# Patient Record
Sex: FEMALE | Race: WHITE | Employment: UNEMPLOYED | URBAN - METROPOLITAN AREA
[De-identification: names, ages, dates, MRNs, and addresses within clinical notes are randomized per-mention and may not be internally consistent; named-entity substitution may affect disease eponyms.]

---

## 2019-01-30 ENCOUNTER — HOSPITAL ENCOUNTER (EMERGENCY)
Age: 34
Discharge: HOME | End: 2019-01-30
Payer: COMMERCIAL

## 2019-01-30 VITALS
WEIGHT: 168.65 LBS | OXYGEN SATURATION: 94 % | RESPIRATION RATE: 16 BRPM | TEMPERATURE: 97.5 F | SYSTOLIC BLOOD PRESSURE: 150 MMHG | HEART RATE: 105 BPM | DIASTOLIC BLOOD PRESSURE: 105 MMHG | HEIGHT: 66 IN | BODY MASS INDEX: 27.1 KG/M2

## 2019-01-30 DIAGNOSIS — J02.9 ACUTE VIRAL PHARYNGITIS: Primary | ICD-10-CM

## 2019-01-30 LAB — S PYO AG THROAT QL: NEGATIVE

## 2019-01-30 PROCEDURE — 87081 CULTURE SCREEN ONLY: CPT

## 2019-01-30 PROCEDURE — 87880 STREP A ASSAY W/OPTIC: CPT

## 2019-01-30 PROCEDURE — 99283 EMERGENCY DEPT VISIT LOW MDM: CPT

## 2019-01-30 RX ORDER — IBUPROFEN 600 MG/1
600 TABLET ORAL EVERY 6 HOURS PRN
Qty: 20 TABLET | Refills: 0 | Status: SHIPPED | OUTPATIENT
Start: 2019-01-30 | End: 2019-07-11

## 2019-01-30 RX ORDER — ACETAMINOPHEN 500 MG
1000 TABLET ORAL EVERY 6 HOURS PRN
Qty: 30 TABLET | Refills: 0 | Status: SHIPPED | OUTPATIENT
Start: 2019-01-30 | End: 2022-01-18 | Stop reason: ALTCHOICE

## 2019-01-30 ASSESSMENT — PAIN SCALES - GENERAL: PAINLEVEL_OUTOF10: 3

## 2019-01-30 ASSESSMENT — PAIN DESCRIPTION - LOCATION: LOCATION: THROAT

## 2019-01-31 LAB
ORGANISM: ABNORMAL
S PYO THROAT QL CULT: ABNORMAL
S PYO THROAT QL CULT: ABNORMAL

## 2019-06-15 ENCOUNTER — HOSPITAL ENCOUNTER (EMERGENCY)
Age: 34
Discharge: HOME | End: 2019-06-16
Payer: COMMERCIAL

## 2019-06-15 VITALS
DIASTOLIC BLOOD PRESSURE: 73 MMHG | OXYGEN SATURATION: 100 % | HEART RATE: 105 BPM | BODY MASS INDEX: 26.36 KG/M2 | RESPIRATION RATE: 16 BRPM | WEIGHT: 164.02 LBS | HEIGHT: 66 IN | SYSTOLIC BLOOD PRESSURE: 114 MMHG | TEMPERATURE: 98 F

## 2019-06-15 DIAGNOSIS — N30.01 ACUTE CYSTITIS WITH HEMATURIA: Primary | ICD-10-CM

## 2019-06-15 LAB
BACTERIA: ABNORMAL /HPF
BILIRUBIN URINE: NEGATIVE
BLOOD, URINE: ABNORMAL
CLARITY: ABNORMAL
COLOR: ABNORMAL
COMMENT UA: ABNORMAL
EPITHELIAL CELLS, UA: ABNORMAL /HPF
GLUCOSE URINE: NEGATIVE MG/DL
HCG(URINE) PREGNANCY TEST: NEGATIVE
KETONES, URINE: ABNORMAL MG/DL
LEUKOCYTE ESTERASE, URINE: ABNORMAL
MICROSCOPIC EXAMINATION: YES
NITRITE, URINE: POSITIVE
PH UA: 6 (ref 5–8)
PROTEIN UA: 100 MG/DL
RBC UA: ABNORMAL /HPF (ref 0–2)
SPECIFIC GRAVITY UA: >=1.03 (ref 1–1.03)
URINE REFLEX TO CULTURE: YES
URINE TYPE: ABNORMAL
UROBILINOGEN, URINE: 0.2 E.U./DL
WBC UA: ABNORMAL /HPF (ref 0–5)
YEAST: PRESENT /HPF

## 2019-06-15 PROCEDURE — 99283 EMERGENCY DEPT VISIT LOW MDM: CPT

## 2019-06-15 PROCEDURE — 87077 CULTURE AEROBIC IDENTIFY: CPT

## 2019-06-15 PROCEDURE — 81001 URINALYSIS AUTO W/SCOPE: CPT

## 2019-06-15 PROCEDURE — 87086 URINE CULTURE/COLONY COUNT: CPT

## 2019-06-15 PROCEDURE — 6370000000 HC RX 637 (ALT 250 FOR IP): Performed by: PHYSICIAN ASSISTANT

## 2019-06-15 PROCEDURE — 87186 SC STD MICRODIL/AGAR DIL: CPT

## 2019-06-15 PROCEDURE — 84703 CHORIONIC GONADOTROPIN ASSAY: CPT

## 2019-06-15 RX ORDER — PHENAZOPYRIDINE HYDROCHLORIDE 100 MG/1
200 TABLET, FILM COATED ORAL ONCE
Status: COMPLETED | OUTPATIENT
Start: 2019-06-15 | End: 2019-06-15

## 2019-06-15 RX ORDER — PHENAZOPYRIDINE HYDROCHLORIDE 200 MG/1
200 TABLET, FILM COATED ORAL 3 TIMES DAILY PRN
Qty: 6 TABLET | Refills: 0 | Status: SHIPPED | OUTPATIENT
Start: 2019-06-15 | End: 2019-06-18

## 2019-06-15 RX ORDER — NITROFURANTOIN 25; 75 MG/1; MG/1
100 CAPSULE ORAL 2 TIMES DAILY
Qty: 9 CAPSULE | Refills: 0 | Status: SHIPPED | OUTPATIENT
Start: 2019-06-15 | End: 2019-06-20

## 2019-06-15 RX ORDER — NITROFURANTOIN 25; 75 MG/1; MG/1
100 CAPSULE ORAL ONCE
Status: COMPLETED | OUTPATIENT
Start: 2019-06-15 | End: 2019-06-15

## 2019-06-15 RX ADMIN — PHENAZOPYRIDINE HYDROCHLORIDE 200 MG: 100 TABLET ORAL at 23:52

## 2019-06-15 RX ADMIN — NITROFURANTOIN MONOHYDRATE/MACROCRYSTALLINE 100 MG: 25; 75 CAPSULE ORAL at 23:52

## 2019-06-15 ASSESSMENT — PAIN DESCRIPTION - DESCRIPTORS: DESCRIPTORS: BURNING

## 2019-06-15 ASSESSMENT — ENCOUNTER SYMPTOMS
EYE PAIN: 0
SHORTNESS OF BREATH: 0
DIARRHEA: 0
BACK PAIN: 0
NAUSEA: 0
ABDOMINAL PAIN: 0
VOMITING: 0
COUGH: 0

## 2019-06-15 ASSESSMENT — PAIN DESCRIPTION - PAIN TYPE: TYPE: ACUTE PAIN

## 2019-06-15 ASSESSMENT — PAIN DESCRIPTION - FREQUENCY: FREQUENCY: CONTINUOUS

## 2019-06-15 ASSESSMENT — PAIN SCALES - GENERAL: PAINLEVEL_OUTOF10: 5

## 2019-06-15 ASSESSMENT — PAIN DESCRIPTION - LOCATION: LOCATION: VAGINA

## 2019-06-16 PROCEDURE — 87491 CHLMYD TRACH DNA AMP PROBE: CPT

## 2019-06-16 PROCEDURE — 87591 N.GONORRHOEAE DNA AMP PROB: CPT

## 2019-06-16 ASSESSMENT — PAIN SCALES - GENERAL: PAINLEVEL_OUTOF10: 5

## 2019-06-16 ASSESSMENT — PAIN - FUNCTIONAL ASSESSMENT: PAIN_FUNCTIONAL_ASSESSMENT: 0-10

## 2019-06-16 NOTE — ED NOTES
**EVALUATED BY ADVANCED PRACTICE PROVIDER**        [address] [facility] ENCOUNTER      Pt Name: Arsen Velazquez  EUY:[id number]  [place] 1985  Date of evaluation: 6/15/2019  Provider: JOAO Goyal      Chief Complaint:    Chief Complaint   Patient presents with    Dysuria    Hematuria       Nursing Notes, Past Medical Hx, Past Surgical Hx, Social Hx, Allergies, and Family Hx were all reviewed and agreed with or any disagreements were addressed in the HPI.    HPI:  (Location, Duration, Timing, Severity,Quality, Assoc Sx, Context, Modifying factors)  This is a  29 y.o. female who presents to the ED for evaluation of dysuria. Onset: 2 days. Reports hematuria and spasming with urinating. She rates her pain is a 5 out of 10. Described as burning in character. It is constant. Worse with urinating. Denies vaginal discharge. No other acute concerns, associated symptoms or modifying factors. PastMedical/Surgical History:      Diagnosis Date    Endocarditis     Hepatitis C     Lyme disease     MRSA (methicillin resistant staph aureus) culture positive 14         Procedure Laterality Date     SECTION         Medications:  Previous Medications    ACETAMINOPHEN (APAP EXTRA STRENGTH) 500 MG TABLET    Take 2 tablets by mouth every 6 hours as needed for Pain DO NOT TAKE WITH OTHER MEDICATIONS CONTAINING ACETAMINOPHEN. IBUPROFEN (ADVIL;MOTRIN) 600 MG TABLET    Take 1 tablet by mouth every 6 hours as needed for Pain    MAGIC MOUTHWASH (MIRACLE MOUTHWASH)    Swish and spit 5 mLs 4 times daily as needed for Pain Equal parts 2% lidocaine, dIphenhydramine, antacid. Review of Systems:  Review of Systems   Constitutional: Negative for chills, fatigue and fever. Eyes: Negative for pain. Respiratory: Negative for cough and shortness of breath. Cardiovascular: Negative for chest pain.    Gastrointestinal: Negative for abdominal pain, diarrhea, nausea and vomiting. Genitourinary: Positive for dysuria and hematuria. Musculoskeletal: Negative for back pain, neck pain and neck stiffness. Skin: Negative for rash. Neurological: Negative for dizziness and headaches. Psychiatric/Behavioral: Negative for confusion. Positives and Pertinent negatives as per HPI. Except as noted above in the ROS, problem specific ROS was completed and is negative. Physical Exam:  Physical Exam   Constitutional: She is oriented to person, place, and time. She appears well-developed. No distress. HENT:   Head: Normocephalic and atraumatic. Eyes: Right eye exhibits no discharge. Left eye exhibits no discharge. Neck: Normal range of motion. Neck supple. Cardiovascular: Normal rate. Pulmonary/Chest: Effort normal. No stridor. No respiratory distress. Abdominal: Soft. There is tenderness (very mild suprapubic/bladder). Musculoskeletal: Normal range of motion. Neurological: She is alert and oriented to person, place, and time. No gross facial drooping. Moves all 4 extremities spontaneously. Skin: Skin is warm and dry. She is not diaphoretic. No pallor. Psychiatric: She has a normal mood and affect. Her behavior is normal.   Nursing note and vitals reviewed.       MEDICAL DECISION MAKING    Vitals:    Vitals:    06/15/19 2227   BP: 114/73   Pulse: 105   Resp: 16   Temp: 98 °F (36.7 °C)   SpO2: 100%   Weight: 164 lb 0.4 oz (74.4 kg)   Height: 5' 6\" (1.676 m)       LABS:  Labs Reviewed   URINE RT REFLEX TO CULTURE - Abnormal; Notable for the following components:       Result Value    Clarity, UA TURBID (*)     Ketones, Urine TRACE (*)     Blood, Urine LARGE (*)     Protein,  (*)     Nitrite, Urine POSITIVE (*)     Leukocyte Esterase, Urine MODERATE (*)     All other components within normal limits    Narrative:     Performed at:  [place] [address] Phone [phone]   MICROSCOPIC URINALYSIS - Abnormal; Notable for the following components:    WBC, UA 20-50 (*)     RBC, UA  (*)     Bacteria, UA 1+ (*)     Yeast, UA Present (*)     All other components within normal limits    Narrative:     Performed at:  [address] [address]   Phone [phone]   URINE CULTURE   C.TRACHOMATIS N.GONORRHOEAE DNA   PREGNANCY, URINE    Narrative:     Performed at:  [address] [address]   Phone ([phone] of labs reviewed and werenegative at this time or not returned at the time of this note. RADIOLOGY:   Non-plain film images such as CT, Ultrasound and MRI are read by the radiologist. JOAO Kwong have directly visualized the radiologic plain film image(s) with the below findings:        Interpretation per the Radiologist below, if available at the time of thisnote:    No orders to display        No results found. MEDICAL DECISION MAKING / ED COURSE:      PROCEDURES:   Procedures    None    Patient was given:  Medications   phenazopyridine (PYRIDIUM) tablet 200 mg (has no administration in time range)   nitrofurantoin (macrocrystal-monohydrate) (MACROBID) capsule 100 mg (has no administration in time range)         Differential Diagnosis: Urinary retention, pyelonephritis, bladder infection, urosepsis, GI emergency, surgical emergency, dehydration, other    Patient is afebrile and nontoxic with unremarkable vital signs. Urinalysis positive for infection. No trich. The patient does report a new sexual partner last week and wanted to be screened gonorrhea and chlamydia. She denies any vaginal discharge or vaginal pain. She was not treated for G/C. Given first dose of Macrobid and Pyridium here in the emergency department.  At this time I believe patient's presentation does not warrant further workup with labs or imaging in the emergency department and is stable for discharge home. Patient will be discharged with medications as listed below with instructions to follow up with the primary care physician for reevaluation in the next few days, and to return to the emergency department for any worsening symptoms or further concerns. They verbalized understanding and were discharged in stable condition. The patient tolerated their visit well. I evaluated the patient. The physician was available for consultation as needed. The patient and / or the family were informed of the results of anytests, a time was given to answer questions, a plan was proposed and they agreed with plan. CLINICAL IMPRESSION:  1.  Acute cystitis with hematuria        DISPOSITION Discharge - Pending Orders Complete 06/15/2019 11:40:01 PM      PATIENT REFERRED TO:  [facility]  [address] [place] [address]  [phone]    If symptoms worsen      DISCHARGE MEDICATIONS:  New Prescriptions    NITROFURANTOIN, MACROCRYSTAL-MONOHYDRATE, (MACROBID) 100 MG CAPSULE    Take 1 capsule by mouth 2 times daily for 5 days (1st dose give in ED)    PHENAZOPYRIDINE (PYRIDIUM) 200 MG TABLET    Take 1 tablet by mouth 3 times daily as needed for Pain (bladder spasm/pain)       DISCONTINUED MEDICATIONS:  Discontinued Medications    No medications on file              (Please note the MDM and HPI sections of this note were completed with a voice recognition program.  Efforts weremade to edit the dictations but occasionally words are mis-transcribed.)    Electronically signed, Renetta Gomez,          Renetta Gomez  06/15/19 8739

## 2019-06-17 LAB
C TRACH DNA GENITAL QL NAA+PROBE: NEGATIVE
N. GONORRHOEAE DNA: NEGATIVE

## 2019-06-18 LAB
ORGANISM: ABNORMAL
URINE CULTURE, ROUTINE: ABNORMAL
URINE CULTURE, ROUTINE: ABNORMAL

## 2019-06-18 NOTE — ED PROVIDER NOTES
abdominal pain, diarrhea, nausea and vomiting. Genitourinary: Positive for dysuria and hematuria. Musculoskeletal: Negative for back pain, neck pain and neck stiffness. Skin: Negative for rash. Neurological: Negative for dizziness and headaches. Psychiatric/Behavioral: Negative for confusion. Positives and Pertinent negatives as per HPI. Except as noted above in the ROS, problem specific ROS was completed and is negative. Physical Exam:  Physical Exam   Constitutional: She is oriented to person, place, and time. She appears well-developed. No distress. HENT:   Head: Normocephalic and atraumatic. Eyes: Right eye exhibits no discharge. Left eye exhibits no discharge. Neck: Normal range of motion. Neck supple. Cardiovascular: Normal rate. Pulmonary/Chest: Effort normal. No stridor. No respiratory distress. Abdominal: Soft. There is tenderness (very mild suprapubic/bladder). Musculoskeletal: Normal range of motion. Neurological: She is alert and oriented to person, place, and time. No gross facial drooping. Moves all 4 extremities spontaneously. Skin: Skin is warm and dry. She is not diaphoretic. No pallor. Psychiatric: She has a normal mood and affect. Her behavior is normal.   Nursing note and vitals reviewed.       MEDICAL DECISION MAKING    Vitals:    Vitals:    06/15/19 2227   BP: 114/73   Pulse: 105   Resp: 16   Temp: 98 °F (36.7 °C)   SpO2: 100%   Weight: 164 lb 0.4 oz (74.4 kg)   Height: 5' 6\" (1.676 m)       LABS:  Labs Reviewed   URINE RT REFLEX TO CULTURE - Abnormal; Notable for the following components:       Result Value    Clarity, UA TURBID (*)     Ketones, Urine TRACE (*)     Blood, Urine LARGE (*)     Protein,  (*)     Nitrite, Urine POSITIVE (*)     Leukocyte Esterase, Urine MODERATE (*)     All other components within normal limits    Narrative:     Performed at:  [facility]  [address] [place] [address] Phone [phone]   MICROSCOPIC URINALYSIS - Abnormal; Notable for the following components:    WBC, UA 20-50 (*)     RBC, UA  (*)     Bacteria, UA 1+ (*)     Yeast, UA Present (*)     All other components within normal limits    Narrative:     Performed at:  [facility]  [address] [facility] [address]   Phone [phone]   URINE CULTURE   C.TRACHOMATIS N.GONORRHOEAE DNA   PREGNANCY, URINE    Narrative:     Performed at:  [facility]  [address] [facility] [address]   Phone ([phone] of labs reviewed and werenegative at this time or not returned at the time of this note. RADIOLOGY:   Non-plain film images such as CT, Ultrasound and MRI are read by the radiologist. JOAO Sequeira have directly visualized the radiologic plain film image(s) with the below findings:        Interpretation per the Radiologist below, if available at the time of thisnote:    No orders to display        No results found. MEDICAL DECISION MAKING / ED COURSE:      PROCEDURES:   Procedures    None    Patient was given:  Medications   phenazopyridine (PYRIDIUM) tablet 200 mg (has no administration in time range)   nitrofurantoin (macrocrystal-monohydrate) (MACROBID) capsule 100 mg (has no administration in time range)         Differential Diagnosis: Urinary retention, pyelonephritis, bladder infection, urosepsis, GI emergency, surgical emergency, dehydration, other    Patient is afebrile and nontoxic with unremarkable vital signs. Urinalysis positive for infection. No trich. The patient does report a new sexual partner last week and wanted to be screened gonorrhea and chlamydia. She denies any vaginal discharge or vaginal pain. She was not treated for G/C. Given first dose of Macrobid and Pyridium here in the emergency department.  At this time I believe patient's presentation does not warrant further workup with labs or imaging in the emergency department and is stable for discharge home. Patient will be discharged with medications as listed below with instructions to follow up with the primary care physician for reevaluation in the next few days, and to return to the emergency department for any worsening symptoms or further concerns. They verbalized understanding and were discharged in stable condition. The patient tolerated their visit well. I evaluated the patient. The physician was available for consultation as needed. The patient and / or the family were informed of the results of anytests, a time was given to answer questions, a plan was proposed and they agreed with plan. CLINICAL IMPRESSION:  1.  Acute cystitis with hematuria        DISPOSITION Discharge - Pending Orders Complete 06/15/2019 11:40:01 PM      PATIENT REFERRED TO:  [address] [facility] Emergency Department  [address] [place] [address]  [phone]    If symptoms worsen      DISCHARGE MEDICATIONS:  New Prescriptions    NITROFURANTOIN, MACROCRYSTAL-MONOHYDRATE, (MACROBID) 100 MG CAPSULE    Take 1 capsule by mouth 2 times daily for 5 days (1st dose give in ED)    PHENAZOPYRIDINE (PYRIDIUM) 200 MG TABLET    Take 1 tablet by mouth 3 times daily as needed for Pain (bladder spasm/pain)       DISCONTINUED MEDICATIONS:  Discontinued Medications    No medications on file              (Please note the MDM and HPI sections of this note were completed with a voice recognition program.  Efforts weremade to edit the dictations but occasionally words are mis-transcribed.)    Electronically signed, Renetta Ford,          Renetta Ford  06/15/19 [address] [place], [place]  06/18/19 4317

## 2019-06-28 NOTE — ED PROVIDER NOTES
**EVALUATED BY ADVANCED PRACTICE PROVIDER**          [address]       Pt Name: Roberta Bright  HCA:[id number]  [place] 1985  Date of evaluation: 6/15/2019  Provider: JOAO Pinto        Chief Complaint:        Chief Complaint   Patient presents with    Dysuria    Hematuria         Nursing Notes, Past Medical Hx, Past Surgical Hx, Social Hx, Allergies, and Family Hx were all reviewed and agreed with or any disagreements were addressed in the HPI.     HPI:  (Location, Duration, Timing, Severity,Quality, Assoc Sx, Context, Modifying factors)  This is a  29 y.o. female who presents to the ED for evaluation of dysuria. Onset: 2 days. Reports hematuria and spasming with urinating. She rates her pain is a 5 out of 10. Described as burning in character. It is constant. Worse with urinating. Denies vaginal discharge. No other acute concerns, associated symptoms or modifying factors.              PastMedical/Surgical History:  Past Medical History             Diagnosis Date    Endocarditis      Hepatitis C      Lyme disease      MRSA (methicillin resistant staph aureus) culture positive 14         Past Surgical History             Procedure Laterality Date     SECTION                Medications:       Previous Medications     ACETAMINOPHEN (APAP EXTRA STRENGTH) 500 MG TABLET    Take 2 tablets by mouth every 6 hours as needed for Pain DO NOT TAKE WITH OTHER MEDICATIONS CONTAINING ACETAMINOPHEN.     IBUPROFEN (ADVIL;MOTRIN) 600 MG TABLET    Take 1 tablet by mouth every 6 hours as needed for Pain     MAGIC MOUTHWASH (MIRACLE MOUTHWASH)    Swish and spit 5 mLs 4 times daily as needed for Pain Equal parts 2% lidocaine, dIphenhydramine, antacid.            Review of Systems:  Review of Systems   Constitutional: Negative for chills, fatigue and fever. Eyes: Negative for pain.    Respiratory: Negative for cough and shortness of breath. Cardiovascular: Negative for chest pain. Gastrointestinal: Negative for abdominal pain, diarrhea, nausea and vomiting. Genitourinary: Positive for dysuria and hematuria. Musculoskeletal: Negative for back pain, neck pain and neck stiffness. Skin: Negative for rash. Neurological: Negative for dizziness and headaches. Psychiatric/Behavioral: Negative for confusion.      Positives and Pertinent negatives as per HPI. Except as noted above in the ROS, problem specific ROS was completed and is negative.     Physical Exam:  Physical Exam   Constitutional: She is oriented to person, place, and time. She appears well-developed. No distress. HENT:   Head: Normocephalic and atraumatic. Eyes: Right eye exhibits no discharge. Left eye exhibits no discharge. Neck: Normal range of motion. Neck supple. Cardiovascular: Normal rate. Pulmonary/Chest: Effort normal. No stridor. No respiratory distress. Abdominal: Soft. There is tenderness (very mild suprapubic/bladder). Musculoskeletal: Normal range of motion. Neurological: She is alert and oriented to person, place, and time. No gross facial drooping. Moves all 4 extremities spontaneously. Skin: Skin is warm and dry. She is not diaphoretic. No pallor. Psychiatric: She has a normal mood and affect.  Her behavior is normal.   Nursing note and vitals reviewed.        MEDICAL DECISION MAKING     Vitals:    Vitals       Vitals:     06/15/19 2227   BP: 114/73   Pulse: 105   Resp: 16   Temp: 98 °F (36.7 °C)   SpO2: 100%   Weight: 164 lb 0.4 oz (74.4 kg)   Height: 5' 6\" (1.676 m)            LABS:        Labs Reviewed   URINE RT REFLEX TO CULTURE - Abnormal; Notable for the following components:       Result Value      Clarity, UA TURBID (*)       Ketones, Urine TRACE (*)       Blood, Urine LARGE (*)       Protein,  (*)       Nitrite, Urine POSITIVE (*)       Leukocyte Esterase, Urine MODERATE (*)       All other components within normal limits     Narrative:      Performed at:  [facility]  [address] [facility] [address]   Phone [phone]   MICROSCOPIC URINALYSIS - Abnormal; Notable for the following components:     WBC, UA 20-50 (*)       RBC, UA  (*)       Bacteria, UA 1+ (*)       Yeast, UA Present (*)       All other components within normal limits     Narrative:      Performed at:  [facility]  [address] [facility] [address]   Phone [phone]   URINE CULTURE   C.TRACHOMATIS N.GONORRHOEAE DNA   PREGNANCY, URINE     Narrative:      [facility]  [facility] [address]   Phone [phone]         Remainder of labs reviewed and werenegative at this time or not returned at the time of this note.     RADIOLOGY:   Non-plain film images such as CT, Ultrasound and MRI are read by the radiologist. JOAO Anderson have directly visualized the radiologic plain film image(s) with the below findings:           Interpretation per the Radiologist below, if available at the time of thisnote:     No orders to display         No results found.       MEDICAL DECISION MAKING / ED COURSE:       PROCEDURES:   Procedures     None     Patient was given:  Medications   phenazopyridine (PYRIDIUM) tablet 200 mg (has no administration in time range)   nitrofurantoin (macrocrystal-monohydrate) (MACROBID) capsule 100 mg (has no administration in time range)            Differential Diagnosis: Urinary retention, pyelonephritis, bladder infection, urosepsis, GI emergency, surgical emergency, dehydration, other     Patient is afebrile and nontoxic with unremarkable vital signs. Urinalysis positive for infection. No trich. The patient does report a new sexual partner last week and wanted to be screened gonorrhea and chlamydia. She denies any vaginal discharge or vaginal pain.  She was not treated for G/C. Given first dose of Macrobid and Pyridium here in the emergency department. At this time I believe patient's presentation does not warrant further workup with labs or imaging in the emergency department and is stable for discharge home. Patient will be discharged with medications as listed below with instructions to follow up with the primary care physician for reevaluation in the next few days, and to return to the emergency department for any worsening symptoms or further concerns. They verbalized understanding and were discharged in stable condition.     The patient tolerated their visit well. I evaluated the patient. The physician was available for consultation as needed. The patient and / or the family were informed of the results of anytests, a time was given to answer questions, a plan was proposed and they agreed with plan.       CLINICAL IMPRESSION:  1.  Acute cystitis with hematuria          DISPOSITION Discharge - Pending Orders Complete 06/15/2019 11:40:01 PM        PATIENT REFERRED TO:  [facility]  [address] [place] [phone]     If symptoms worsen        DISCHARGE MEDICATIONS:  New Prescriptions     NITROFURANTOIN, MACROCRYSTAL-MONOHYDRATE, (MACROBID) 100 MG CAPSULE    Take 1 capsule by mouth 2 times daily for 5 days (1st dose give in ED)     PHENAZOPYRIDINE (PYRIDIUM) 200 MG TABLET    Take 1 tablet by mouth 3 times daily as needed for Pain (bladder spasm/pain)         DISCONTINUED MEDICATIONS:      Discontinued Medications     No medications on file               (Please note the MDM and HPI sections of this note were completed with a voice recognition program.  Efforts weremade to edit the dictations but occasionally words are mis-transcribed.)     Electronically signed, JOAO Jordan,           Wilian Jordanma  06/15/19 [place]  06/28/19 [id number]

## 2019-07-11 ENCOUNTER — APPOINTMENT (OUTPATIENT)
Dept: GENERAL RADIOLOGY | Age: 34
End: 2019-07-11
Payer: COMMERCIAL

## 2019-07-11 ENCOUNTER — APPOINTMENT (OUTPATIENT)
Dept: ULTRASOUND IMAGING | Age: 34
End: 2019-07-11
Payer: COMMERCIAL

## 2019-07-11 ENCOUNTER — HOSPITAL ENCOUNTER (EMERGENCY)
Age: 34
Discharge: HOME | End: 2019-07-11
Payer: COMMERCIAL

## 2019-07-11 ENCOUNTER — APPOINTMENT (OUTPATIENT)
Dept: CT IMAGING | Age: 34
End: 2019-07-11
Payer: COMMERCIAL

## 2019-07-11 VITALS
OXYGEN SATURATION: 100 % | BODY MASS INDEX: 25.3 KG/M2 | WEIGHT: 156.75 LBS | SYSTOLIC BLOOD PRESSURE: 125 MMHG | TEMPERATURE: 97.1 F | DIASTOLIC BLOOD PRESSURE: 76 MMHG | RESPIRATION RATE: 16 BRPM | HEART RATE: 100 BPM

## 2019-07-11 DIAGNOSIS — F19.90 IVDU (INTRAVENOUS DRUG USER): ICD-10-CM

## 2019-07-11 DIAGNOSIS — R10.9 ABDOMINAL CRAMPING: ICD-10-CM

## 2019-07-11 DIAGNOSIS — N83.201 CYST OF RIGHT OVARY: ICD-10-CM

## 2019-07-11 DIAGNOSIS — N39.0 URINARY TRACT INFECTION WITHOUT HEMATURIA, SITE UNSPECIFIED: ICD-10-CM

## 2019-07-11 DIAGNOSIS — L98.9 SKIN LESION: ICD-10-CM

## 2019-07-11 DIAGNOSIS — K76.9 HEPATIC LESION: ICD-10-CM

## 2019-07-11 DIAGNOSIS — R19.7 DIARRHEA, UNSPECIFIED TYPE: Primary | ICD-10-CM

## 2019-07-11 LAB
A/G RATIO: 1.3 (ref 1.1–2.2)
ALBUMIN SERPL-MCNC: 4 G/DL (ref 3.4–5)
ALP BLD-CCNC: 66 U/L (ref 40–129)
ALT SERPL-CCNC: 12 U/L (ref 10–40)
ANION GAP SERPL CALCULATED.3IONS-SCNC: 12 MMOL/L (ref 3–16)
AST SERPL-CCNC: 13 U/L (ref 15–37)
BACTERIA: ABNORMAL /HPF
BASOPHILS ABSOLUTE: 0 K/UL (ref 0–0.2)
BASOPHILS RELATIVE PERCENT: 0.5 %
BILIRUB SERPL-MCNC: <0.2 MG/DL (ref 0–1)
BILIRUBIN URINE: ABNORMAL
BLOOD, URINE: ABNORMAL
BUN BLDV-MCNC: 8 MG/DL (ref 7–20)
CALCIUM SERPL-MCNC: 9.8 MG/DL (ref 8.3–10.6)
CHLORIDE BLD-SCNC: 100 MMOL/L (ref 99–110)
CLARITY: ABNORMAL
CO2: 25 MMOL/L (ref 21–32)
COLOR: ABNORMAL
CREAT SERPL-MCNC: 0.6 MG/DL (ref 0.6–1.1)
CRYSTALS, UA: ABNORMAL /HPF
EOSINOPHILS ABSOLUTE: 0.1 K/UL (ref 0–0.6)
EOSINOPHILS RELATIVE PERCENT: 1.6 %
EPITHELIAL CELLS, UA: >36 /HPF (ref 0–5)
GFR AFRICAN AMERICAN: >60
GFR NON-AFRICAN AMERICAN: >60
GLOBULIN: 3.2 G/DL
GLUCOSE BLD-MCNC: 103 MG/DL (ref 70–99)
GLUCOSE URINE: NEGATIVE MG/DL
HCG(URINE) PREGNANCY TEST: NEGATIVE
HCT VFR BLD CALC: 35.2 % (ref 36–48)
HEMOGLOBIN: 11.6 G/DL (ref 12–16)
HYALINE CASTS: 5 /LPF (ref 0–8)
KETONES, URINE: NEGATIVE MG/DL
LACTIC ACID: 0.6 MMOL/L (ref 0.4–2)
LEUKOCYTE ESTERASE, URINE: ABNORMAL
LIPASE: 16 U/L (ref 13–60)
LYMPHOCYTES ABSOLUTE: 2.4 K/UL (ref 1–5.1)
LYMPHOCYTES RELATIVE PERCENT: 32.2 %
MCH RBC QN AUTO: 28.7 PG (ref 26–34)
MCHC RBC AUTO-ENTMCNC: 33 G/DL (ref 31–36)
MCV RBC AUTO: 87 FL (ref 80–100)
MICROSCOPIC EXAMINATION: YES
MONOCYTES ABSOLUTE: 0.5 K/UL (ref 0–1.3)
MONOCYTES RELATIVE PERCENT: 6.7 %
NEUTROPHILS ABSOLUTE: 4.5 K/UL (ref 1.7–7.7)
NEUTROPHILS RELATIVE PERCENT: 59 %
NITRITE, URINE: NEGATIVE
PDW BLD-RTO: 13.5 % (ref 12.4–15.4)
PH UA: 6 (ref 5–8)
PLATELET # BLD: 292 K/UL (ref 135–450)
PMV BLD AUTO: 7.8 FL (ref 5–10.5)
POTASSIUM REFLEX MAGNESIUM: 3.9 MMOL/L (ref 3.5–5.1)
PROTEIN UA: ABNORMAL MG/DL
RBC # BLD: 4.04 M/UL (ref 4–5.2)
RBC UA: 7 /HPF (ref 0–4)
SODIUM BLD-SCNC: 137 MMOL/L (ref 136–145)
SPECIFIC GRAVITY UA: 1.02 (ref 1–1.03)
TOTAL PROTEIN: 7.2 G/DL (ref 6.4–8.2)
TROPONIN: <0.01 NG/ML
URINE REFLEX TO CULTURE: YES
URINE TYPE: ABNORMAL
UROBILINOGEN, URINE: 1 E.U./DL
WBC # BLD: 7.6 K/UL (ref 4–11)
WBC UA: 115 /HPF (ref 0–5)

## 2019-07-11 PROCEDURE — 87086 URINE CULTURE/COLONY COUNT: CPT

## 2019-07-11 PROCEDURE — 76830 TRANSVAGINAL US NON-OB: CPT

## 2019-07-11 PROCEDURE — 87040 BLOOD CULTURE FOR BACTERIA: CPT

## 2019-07-11 PROCEDURE — 2580000003 HC RX 258: Performed by: NURSE PRACTITIONER

## 2019-07-11 PROCEDURE — 83605 ASSAY OF LACTIC ACID: CPT

## 2019-07-11 PROCEDURE — 96374 THER/PROPH/DIAG INJ IV PUSH: CPT

## 2019-07-11 PROCEDURE — 71046 X-RAY EXAM CHEST 2 VIEWS: CPT

## 2019-07-11 PROCEDURE — 74176 CT ABD & PELVIS W/O CONTRAST: CPT

## 2019-07-11 PROCEDURE — 84703 CHORIONIC GONADOTROPIN ASSAY: CPT

## 2019-07-11 PROCEDURE — 93005 ELECTROCARDIOGRAM TRACING: CPT | Performed by: NURSE PRACTITIONER

## 2019-07-11 PROCEDURE — 36415 COLL VENOUS BLD VENIPUNCTURE: CPT

## 2019-07-11 PROCEDURE — 6360000002 HC RX W HCPCS: Performed by: NURSE PRACTITIONER

## 2019-07-11 PROCEDURE — 96361 HYDRATE IV INFUSION ADD-ON: CPT

## 2019-07-11 PROCEDURE — 85025 COMPLETE CBC W/AUTO DIFF WBC: CPT

## 2019-07-11 PROCEDURE — 84484 ASSAY OF TROPONIN QUANT: CPT

## 2019-07-11 PROCEDURE — 80053 COMPREHEN METABOLIC PANEL: CPT

## 2019-07-11 PROCEDURE — 83690 ASSAY OF LIPASE: CPT

## 2019-07-11 PROCEDURE — 81001 URINALYSIS AUTO W/SCOPE: CPT

## 2019-07-11 PROCEDURE — 99284 EMERGENCY DEPT VISIT MOD MDM: CPT

## 2019-07-11 PROCEDURE — 76856 US EXAM PELVIC COMPLETE: CPT

## 2019-07-11 RX ORDER — CEFUROXIME AXETIL 250 MG/1
250 TABLET ORAL 2 TIMES DAILY
Qty: 14 TABLET | Refills: 0 | Status: SHIPPED | OUTPATIENT
Start: 2019-07-11 | End: 2019-07-18

## 2019-07-11 RX ORDER — ONDANSETRON 4 MG/1
4-8 TABLET, FILM COATED ORAL EVERY 12 HOURS PRN
Qty: 30 TABLET | Refills: 0 | Status: SHIPPED | OUTPATIENT
Start: 2019-07-11 | End: 2020-01-07

## 2019-07-11 RX ORDER — ONDANSETRON 2 MG/ML
4 INJECTION INTRAMUSCULAR; INTRAVENOUS ONCE
Status: COMPLETED | OUTPATIENT
Start: 2019-07-11 | End: 2019-07-11

## 2019-07-11 RX ORDER — IBUPROFEN 800 MG/1
800 TABLET ORAL EVERY 8 HOURS PRN
Qty: 30 TABLET | Refills: 0 | Status: SHIPPED | OUTPATIENT
Start: 2019-07-11 | End: 2020-01-04

## 2019-07-11 RX ORDER — 0.9 % SODIUM CHLORIDE 0.9 %
1000 INTRAVENOUS SOLUTION INTRAVENOUS ONCE
Status: COMPLETED | OUTPATIENT
Start: 2019-07-11 | End: 2019-07-11

## 2019-07-11 RX ADMIN — SODIUM CHLORIDE 1000 ML: 9 INJECTION, SOLUTION INTRAVENOUS at 18:48

## 2019-07-11 RX ADMIN — ONDANSETRON 4 MG: 2 INJECTION INTRAMUSCULAR; INTRAVENOUS at 18:48

## 2019-07-11 ASSESSMENT — ENCOUNTER SYMPTOMS
DIARRHEA: 1
NAUSEA: 1
BLOOD IN STOOL: 0
BACK PAIN: 0
VOMITING: 0
ABDOMINAL DISTENTION: 0
SHORTNESS OF BREATH: 0
ABDOMINAL PAIN: 1
COLOR CHANGE: 1
COUGH: 0

## 2019-07-11 ASSESSMENT — PAIN SCALES - GENERAL
PAINLEVEL_OUTOF10: 1
PAINLEVEL_OUTOF10: 3

## 2019-07-11 ASSESSMENT — PAIN - FUNCTIONAL ASSESSMENT: PAIN_FUNCTIONAL_ASSESSMENT: 0-10

## 2019-07-11 NOTE — ED PROVIDER NOTES
**THIS IS AN INDEPENDENT BORIS ENCOUNTER**          [address] [facility] [address]        Pt Name: Vania Vera  MRN: [id number]  [place] 1985  Date of evaluation: 7/11/2019  Provider: SUZETTE Hutchins CNP  PCP: No primary care provider on file. CHIEF COMPLAINT       Chief Complaint   Patient presents with    Fever    Abscess    Constipation    Diarrhea       HISTORY OF PRESENT ILLNESS   (Location/Symptom, Timing/Onset, Context/Setting, Quality, Duration, Modifying Factors, Severity)  Note limiting factors. Vania Vera is a 29 y.o. female with PMH significant for IVDA (heroin, cocaine, and meth) and remote history of endocarditis who presents to the emergency department today with boyfriend complaining of nausea, diarrhea, abdominal pain, and subjective fevers. She states that 2 days ago she was having some constipation so she took Dulcolax yesterday so now she has been having diarrhea. She denies hematochezia. No emesis. No measured fevers chills. She denies chest pain or shortness of breath. No cough. She denies burning with urination or blood in her urine. She states she had an IUD placed last October and has been having irregular periods since that time. She also has a small red nodule from an injection site on her left forearm that is already open and draining but is concerning her due to a history of endocarditis. Nursing Notes were all reviewed and agreed with or any disagreements were addressed  in the HPI. REVIEW OF SYSTEMS    (2-9 systems for level 4, 10 or more for level 5)     Review of Systems   Constitutional: Positive for chills. Negative for diaphoresis, fatigue and fever. HENT: Negative. Eyes: Negative for visual disturbance. Respiratory: Negative for cough and shortness of breath. Cardiovascular: Negative for chest pain. Gastrointestinal: Positive for abdominal pain, diarrhea and nausea. None   Tobacco Use    Smoking status: Current Every Day Smoker     Packs/day: 1.00     Types: Cigarettes    Smokeless tobacco: Never Used   Substance and Sexual Activity    Alcohol use: No    Drug use: No    Sexual activity: None   Lifestyle    Physical activity:     Days per week: None     Minutes per session: None    Stress: None   Relationships    Social connections:     Talks on phone: None     Gets together: None     Attends Rastafari service: None     Active member of club or organization: None     Attends meetings of clubs or organizations: None     Relationship status: None    Intimate partner violence:     Fear of current or ex partner: None     Emotionally abused: None     Physically abused: None     Forced sexual activity: None   Other Topics Concern    None   Social History Narrative    None       SCREENINGS             PHYSICAL EXAM    (up to 7 for level 4, 8 or more for level 5)     ED Triage Vitals   BP Temp Temp Source Pulse Resp SpO2 Height Weight   07/11/19 1750 07/11/19 1750 07/11/19 1750 07/11/19 1750 07/11/19 1750 07/11/19 1750 -- 07/11/19 1751   126/78 97.1 °F (36.2 °C) Oral 103 16 99 %  156 lb 12 oz (71.1 kg)       Physical Exam   Constitutional: She is oriented to person, place, and time. Vital signs are normal. She appears well-developed and well-nourished. Non-toxic appearance. No distress. HENT:   Head: Normocephalic and atraumatic. Eyes: Conjunctivae are normal. No scleral icterus. Neck: Normal range of motion. Neck supple. No JVD present. Cardiovascular: Normal rate and regular rhythm. Exam reveals no gallop and no friction rub. No murmur heard. Pulmonary/Chest: Effort normal and breath sounds normal. No respiratory distress. Abdominal: Soft. Normal appearance and bowel sounds are normal. She exhibits no distension and no mass. There is tenderness. There is no rigidity and no guarding. No hernia. Musculoskeletal: Normal range of motion.    Neurological: She

## 2019-07-12 LAB
EKG ATRIAL RATE: 71 BPM
EKG DIAGNOSIS: NORMAL
EKG P AXIS: 53 DEGREES
EKG P-R INTERVAL: 126 MS
EKG Q-T INTERVAL: 376 MS
EKG QRS DURATION: 110 MS
EKG QTC CALCULATION (BAZETT): 408 MS
EKG R AXIS: 42 DEGREES
EKG T AXIS: 41 DEGREES
EKG VENTRICULAR RATE: 71 BPM

## 2019-07-12 PROCEDURE — 93010 ELECTROCARDIOGRAM REPORT: CPT | Performed by: INTERNAL MEDICINE

## 2019-07-13 LAB — URINE CULTURE, ROUTINE: NORMAL

## 2019-07-16 LAB
BLOOD CULTURE, ROUTINE: NORMAL
CULTURE, BLOOD 2: NORMAL

## 2019-08-02 ENCOUNTER — APPOINTMENT (OUTPATIENT)
Dept: CT IMAGING | Age: 34
End: 2019-08-02
Payer: COMMERCIAL

## 2019-08-02 ENCOUNTER — HOSPITAL ENCOUNTER (EMERGENCY)
Age: 34
Discharge: HOME | End: 2019-08-03
Payer: COMMERCIAL

## 2019-08-02 VITALS
DIASTOLIC BLOOD PRESSURE: 94 MMHG | OXYGEN SATURATION: 99 % | HEART RATE: 88 BPM | TEMPERATURE: 97.1 F | SYSTOLIC BLOOD PRESSURE: 170 MMHG | RESPIRATION RATE: 17 BRPM | BODY MASS INDEX: 27.54 KG/M2 | WEIGHT: 170.64 LBS
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
## 2019-08-02 DIAGNOSIS — T74.21XA SEXUAL ASSAULT OF ADULT, INITIAL ENCOUNTER: Primary | ICD-10-CM

## 2019-08-02 DIAGNOSIS — S00.83XA FACIAL CONTUSION, INITIAL ENCOUNTER: ICD-10-CM

## 2019-08-02 PROCEDURE — 99285 EMERGENCY DEPT VISIT HI MDM: CPT

## 2019-08-02 PROCEDURE — 70480 CT ORBIT/EAR/FOSSA W/O DYE: CPT

## 2019-08-02 ASSESSMENT — ENCOUNTER SYMPTOMS
FACIAL SWELLING: 1
EYE PAIN: 0
VOMITING: 0
SHORTNESS OF BREATH: 0
COUGH: 0
DIARRHEA: 0
NAUSEA: 0
ABDOMINAL PAIN: 0
BACK PAIN: 0

## 2019-08-03 PROCEDURE — 6370000000 HC RX 637 (ALT 250 FOR IP): Performed by: PHYSICIAN ASSISTANT

## 2019-08-03 PROCEDURE — 90715 TDAP VACCINE 7 YRS/> IM: CPT | Performed by: PHYSICIAN ASSISTANT

## 2019-08-03 PROCEDURE — 96372 THER/PROPH/DIAG INJ SC/IM: CPT

## 2019-08-03 PROCEDURE — 6360000002 HC RX W HCPCS: Performed by: PHYSICIAN ASSISTANT

## 2019-08-03 PROCEDURE — 90471 IMMUNIZATION ADMIN: CPT | Performed by: PHYSICIAN ASSISTANT

## 2019-08-03 RX ORDER — AZITHROMYCIN 500 MG/1
1000 TABLET, FILM COATED ORAL ONCE
Status: COMPLETED | OUTPATIENT
Start: 2019-08-03 | End: 2019-08-03

## 2019-08-03 RX ORDER — CEFTRIAXONE SODIUM 250 MG/1
250 INJECTION, POWDER, FOR SOLUTION INTRAMUSCULAR; INTRAVENOUS ONCE
Status: COMPLETED | OUTPATIENT
Start: 2019-08-03 | End: 2019-08-03

## 2019-08-03 RX ORDER — METRONIDAZOLE 500 MG/1
2000 TABLET ORAL ONCE
Status: COMPLETED | OUTPATIENT
Start: 2019-08-03 | End: 2019-08-03

## 2019-08-03 RX ADMIN — CEFTRIAXONE SODIUM 250 MG: 250 INJECTION, POWDER, FOR SOLUTION INTRAMUSCULAR; INTRAVENOUS at 02:16

## 2019-08-03 RX ADMIN — TETANUS TOXOID, REDUCED DIPHTHERIA TOXOID AND ACELLULAR PERTUSSIS VACCINE, ADSORBED 0.5 ML: 5; 2.5; 8; 8; 2.5 SUSPENSION INTRAMUSCULAR at 02:15

## 2019-08-03 RX ADMIN — AZITHROMYCIN 1000 MG: 500 TABLET, FILM COATED ORAL at 02:15

## 2019-08-03 RX ADMIN — METRONIDAZOLE 2000 MG: 500 TABLET, FILM COATED ORAL at 02:15
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
## 2019-12-24 ENCOUNTER — HOSPITAL ENCOUNTER (EMERGENCY)
Age: 34
Discharge: HOME | End: 2019-12-24
Attending: EMERGENCY MEDICINE
Payer: COMMERCIAL

## 2019-12-24 VITALS
HEIGHT: 66 IN | HEART RATE: 104 BPM | WEIGHT: 170 LBS | OXYGEN SATURATION: 100 % | DIASTOLIC BLOOD PRESSURE: 83 MMHG | BODY MASS INDEX: 27.32 KG/M2 | TEMPERATURE: 98.4 F | SYSTOLIC BLOOD PRESSURE: 130 MMHG | RESPIRATION RATE: 20 BRPM

## 2019-12-24 DIAGNOSIS — T50.901A ACCIDENTAL DRUG OVERDOSE, INITIAL ENCOUNTER: Primary | ICD-10-CM

## 2019-12-24 PROCEDURE — 99283 EMERGENCY DEPT VISIT LOW MDM: CPT

## 2020-01-04 ENCOUNTER — APPOINTMENT (OUTPATIENT)
Dept: GENERAL RADIOLOGY | Age: 35
End: 2020-01-04
Payer: COMMERCIAL

## 2020-01-04 ENCOUNTER — HOSPITAL ENCOUNTER (EMERGENCY)
Age: 35
Discharge: HOME | End: 2020-01-04
Payer: COMMERCIAL

## 2020-01-04 VITALS
RESPIRATION RATE: 16 BRPM | WEIGHT: 173.06 LBS | SYSTOLIC BLOOD PRESSURE: 126 MMHG | OXYGEN SATURATION: 100 % | BODY MASS INDEX: 27.81 KG/M2 | HEIGHT: 66 IN | DIASTOLIC BLOOD PRESSURE: 74 MMHG | TEMPERATURE: 98.6 F | HEART RATE: 84 BPM

## 2020-01-04 LAB
BACTERIA: ABNORMAL /HPF
BILIRUBIN URINE: NEGATIVE
BLOOD, URINE: NEGATIVE
CLARITY: ABNORMAL
COLOR: YELLOW
COMMENT UA: ABNORMAL
EPITHELIAL CELLS, UA: >36 /HPF (ref 0–5)
GLUCOSE URINE: NEGATIVE MG/DL
HCG(URINE) PREGNANCY TEST: NEGATIVE
HYALINE CASTS: 5 /LPF (ref 0–8)
KETONES, URINE: NEGATIVE MG/DL
LEUKOCYTE ESTERASE, URINE: ABNORMAL
MICROSCOPIC EXAMINATION: YES
NITRITE, URINE: NEGATIVE
PH UA: 5.5 (ref 5–8)
PROTEIN UA: NEGATIVE MG/DL
RBC UA: 4 /HPF (ref 0–4)
SPECIFIC GRAVITY UA: 1.02 (ref 1–1.03)
URINE REFLEX TO CULTURE: YES
URINE TYPE: ABNORMAL
UROBILINOGEN, URINE: 1 E.U./DL
WBC UA: 110 /HPF (ref 0–5)

## 2020-01-04 PROCEDURE — 73140 X-RAY EXAM OF FINGER(S): CPT

## 2020-01-04 PROCEDURE — 84703 CHORIONIC GONADOTROPIN ASSAY: CPT

## 2020-01-04 PROCEDURE — 81001 URINALYSIS AUTO W/SCOPE: CPT

## 2020-01-04 PROCEDURE — 99283 EMERGENCY DEPT VISIT LOW MDM: CPT

## 2020-01-04 PROCEDURE — 4500000021 HC ED LEVEL 1 PROCEDURE

## 2020-01-04 PROCEDURE — 87086 URINE CULTURE/COLONY COUNT: CPT

## 2020-01-04 RX ORDER — NAPROXEN 500 MG/1
500 TABLET ORAL 2 TIMES DAILY WITH MEALS
Qty: 20 TABLET | Refills: 0 | Status: SHIPPED | OUTPATIENT
Start: 2020-01-04 | End: 2020-06-02

## 2020-01-04 RX ORDER — CEFUROXIME AXETIL 250 MG/1
250 TABLET ORAL 2 TIMES DAILY
Qty: 14 TABLET | Refills: 0 | Status: SHIPPED | OUTPATIENT
Start: 2020-01-04 | End: 2020-01-07

## 2020-01-04 ASSESSMENT — PAIN SCALES - GENERAL: PAINLEVEL_OUTOF10: 0

## 2020-01-04 ASSESSMENT — ENCOUNTER SYMPTOMS: COLOR CHANGE: 1

## 2020-01-05 LAB — URINE CULTURE, ROUTINE: NORMAL

## 2020-01-05 NOTE — ED TRIAGE NOTES
Patient to ED states left middle finger swelling that started last night, no pain just stiffness, no known injury. Splint placed to injured finger, bending splint per Mally SHEPHERD request. Patient tolerated well.

## 2020-01-05 NOTE — ED PROVIDER NOTES
Psychiatric/Behavioral: Negative for agitation, behavioral problems and confusion. Except as noted above the remainder of the review of systems was reviewed and negative. PAST MEDICAL HISTORY         Diagnosis Date    Endocarditis     Hepatitis C     Lyme disease     MRSA (methicillin resistant staph aureus) culture positive 14       SURGICAL HISTORY           Procedure Laterality Date     SECTION         CURRENT MEDICATIONS       Discharge Medication List as of 2020  7:04 PM      CONTINUE these medications which have NOT CHANGED    Details   ondansetron (ZOFRAN) 4 MG tablet Take 1-2 tablets by mouth every 12 hours as needed for Nausea, Disp-30 tablet, R-0Print      acetaminophen (APAP EXTRA STRENGTH) 500 MG tablet Take 2 tablets by mouth every 6 hours as needed for Pain DO NOT TAKE WITH OTHER MEDICATIONS CONTAINING ACETAMINOPHEN., Disp-30 tablet, R-0Print      Magic Mouthwash (MIRACLE MOUTHWASH) Swish and spit 5 mLs 4 times daily as needed for Pain Equal parts 2% lidocaine, dIphenhydramine, antacid., Disp-240 mL, R-0Print             ALLERGIES     Patient has no known allergies. FAMILY HISTORY     History reviewed. No pertinent family history. No family status information on file. SOCIAL HISTORY      reports that she has never smoked. She has never used smokeless tobacco. She reports current drug use. She reports that she does not drink alcohol. PHYSICAL EXAM    (up to 7 for level 4, 8 or more for level 5)     ED Triage Vitals [20 1756]   BP Temp Temp Source Pulse Resp SpO2 Height Weight   (!) 140/78 98.6 °F (37 °C) Oral 99 14 100 % 5' 6\" (1.676 m) 173 lb 1 oz (78.5 kg)     Physical Exam  Vitals signs and nursing note reviewed. Constitutional:       Appearance: Normal appearance. HENT:      Head: Normocephalic and atraumatic. Cardiovascular:      Rate and Rhythm: Normal rate. Pulses: Normal pulses.    Musculoskeletal:      Left hand: She exhibits decreased range of motion, tenderness and swelling. She exhibits normal capillary refill. Normal sensation noted. Hands:    Skin:     General: Skin is warm. Neurological:      General: No focal deficit present. Mental Status: She is alert and oriented to person, place, and time. Psychiatric:         Mood and Affect: Mood normal.         Behavior: Behavior normal.         DIAGNOSTIC RESULTS     RADIOLOGY:   Non-plain film images such as CT, Ultrasound and MRI are read by the radiologist. Plain radiographic images are visualized and preliminarily interpreted by JOAO Shah with the below findings:    Reviewed radiologist dictation. Interpretation per the Radiologist below, if available at the time of this note:    XR FINGER LEFT (MIN 2 VIEWS)   Final Result   Volar plate avulsion fracture 3rd middle phalanx proximally               LABS:  Labs Reviewed   URINE RT REFLEX TO CULTURE - Abnormal; Notable for the following components:       Result Value    Clarity, UA TURBID (*)     Leukocyte Esterase, Urine SMALL (*)     All other components within normal limits    Narrative:     Performed at:  [address] [address]   Phone [phone]   MICROSCOPIC URINALYSIS - Abnormal; Notable for the following components:    Bacteria, UA 2+ (*)     WBC,  (*)     Epi Cells >36 (*)     All other components within normal limits    Narrative:     Performed at:  [address] [place] [address]   Phone [phone]   URINE CULTURE   PREGNANCY, URINE    Narrative:     Performed at:  [address] [address]   Phone [phone]       All other labs were within normal range or not returned as of this dictation.     EMERGENCY DEPARTMENT COURSE and DIFFERENTIAL DIAGNOSIS/MDM:   Vitals:    Vitals:    01/04/20 1756 01/04/20 1915

## 2020-01-07 ENCOUNTER — OFFICE VISIT (OUTPATIENT)
Dept: ORTHOPEDIC SURGERY | Age: 35
End: 2020-01-07
Payer: COMMERCIAL

## 2020-01-07 VITALS — BODY MASS INDEX: 25.71 KG/M2 | RESPIRATION RATE: 16 BRPM | HEIGHT: 66 IN | WEIGHT: 160 LBS

## 2020-01-07 PROBLEM — S62.653A CLOSED NONDISPLACED FRACTURE OF MIDDLE PHALANX OF LEFT MIDDLE FINGER: Status: ACTIVE | Noted: 2020-01-07

## 2020-01-07 PROCEDURE — 99203 OFFICE O/P NEW LOW 30 MIN: CPT | Performed by: ORTHOPAEDIC SURGERY

## 2020-01-07 PROCEDURE — G8419 CALC BMI OUT NRM PARAM NOF/U: HCPCS | Performed by: ORTHOPAEDIC SURGERY

## 2020-01-07 PROCEDURE — G8484 FLU IMMUNIZE NO ADMIN: HCPCS | Performed by: ORTHOPAEDIC SURGERY

## 2020-01-07 PROCEDURE — G8427 DOCREV CUR MEDS BY ELIG CLIN: HCPCS | Performed by: ORTHOPAEDIC SURGERY

## 2020-01-07 PROCEDURE — 1036F TOBACCO NON-USER: CPT | Performed by: ORTHOPAEDIC SURGERY

## 2020-01-07 NOTE — PROGRESS NOTES
including all treatment options. All questions are answered. No treatment is required at this time. She is instructed about activity precautions and a range of motion exercise program, which is fully discussed and demonstrated. The usual course of events in the resolution of the symptoms associated with this condition is fully discussed with the patient. As long as they progress as expected, they do not need to return for further follow up. They are, however, urged to call or return if they have questions or concerns.

## 2020-04-16 ENCOUNTER — HOSPITAL ENCOUNTER (EMERGENCY)
Age: 35
Discharge: HOME | End: 2020-04-16
Payer: COMMERCIAL

## 2020-04-16 VITALS
SYSTOLIC BLOOD PRESSURE: 118 MMHG | OXYGEN SATURATION: 100 % | RESPIRATION RATE: 21 BRPM | HEART RATE: 107 BPM | TEMPERATURE: 99.1 F | HEIGHT: 66 IN | DIASTOLIC BLOOD PRESSURE: 75 MMHG | BODY MASS INDEX: 27.64 KG/M2 | WEIGHT: 172 LBS

## 2020-04-16 LAB
A/G RATIO: 1.3 (ref 1.1–2.2)
ACETAMINOPHEN LEVEL: <5 UG/ML (ref 10–30)
ALBUMIN SERPL-MCNC: 4.6 G/DL (ref 3.4–5)
ALP BLD-CCNC: 106 U/L (ref 40–129)
ALT SERPL-CCNC: 215 U/L (ref 10–40)
AMPHETAMINE SCREEN, URINE: POSITIVE
ANION GAP SERPL CALCULATED.3IONS-SCNC: 13 MMOL/L (ref 3–16)
AST SERPL-CCNC: 158 U/L (ref 15–37)
BACTERIA: ABNORMAL /HPF
BARBITURATE SCREEN URINE: ABNORMAL
BASOPHILS ABSOLUTE: 0.2 K/UL (ref 0–0.2)
BASOPHILS RELATIVE PERCENT: 2.1 %
BENZODIAZEPINE SCREEN, URINE: POSITIVE
BILIRUB SERPL-MCNC: 0.6 MG/DL (ref 0–1)
BILIRUBIN URINE: NEGATIVE
BLOOD, URINE: NEGATIVE
BUN BLDV-MCNC: 12 MG/DL (ref 7–20)
CALCIUM SERPL-MCNC: 9.8 MG/DL (ref 8.3–10.6)
CANNABINOID SCREEN URINE: POSITIVE
CHLORIDE BLD-SCNC: 97 MMOL/L (ref 99–110)
CLARITY: ABNORMAL
CO2: 27 MMOL/L (ref 21–32)
COCAINE METABOLITE SCREEN URINE: POSITIVE
COLOR: YELLOW
CREAT SERPL-MCNC: 0.7 MG/DL (ref 0.6–1.1)
EOSINOPHILS ABSOLUTE: 0.1 K/UL (ref 0–0.6)
EOSINOPHILS RELATIVE PERCENT: 0.9 %
EPITHELIAL CELLS, UA: 32 /HPF (ref 0–5)
ETHANOL: NORMAL MG/DL (ref 0–0.08)
GFR AFRICAN AMERICAN: >60
GFR NON-AFRICAN AMERICAN: >60
GLOBULIN: 3.5 G/DL
GLUCOSE BLD-MCNC: 95 MG/DL (ref 70–99)
GLUCOSE URINE: NEGATIVE MG/DL
HCG QUALITATIVE: NEGATIVE
HCT VFR BLD CALC: 37.9 % (ref 36–48)
HEMOGLOBIN: 12.6 G/DL (ref 12–16)
HYALINE CASTS: 6 /LPF (ref 0–8)
KETONES, URINE: NEGATIVE MG/DL
LEUKOCYTE ESTERASE, URINE: NEGATIVE
LYMPHOCYTES ABSOLUTE: 3.3 K/UL (ref 1–5.1)
LYMPHOCYTES RELATIVE PERCENT: 38.8 %
Lab: ABNORMAL
MCH RBC QN AUTO: 28.7 PG (ref 26–34)
MCHC RBC AUTO-ENTMCNC: 33.4 G/DL (ref 31–36)
MCV RBC AUTO: 86.1 FL (ref 80–100)
METHADONE SCREEN, URINE: ABNORMAL
MICROSCOPIC EXAMINATION: YES
MONOCYTES ABSOLUTE: 0.8 K/UL (ref 0–1.3)
MONOCYTES RELATIVE PERCENT: 9.1 %
NEUTROPHILS ABSOLUTE: 4.2 K/UL (ref 1.7–7.7)
NEUTROPHILS RELATIVE PERCENT: 49.1 %
NITRITE, URINE: NEGATIVE
OPIATE SCREEN URINE: POSITIVE
OXYCODONE URINE: ABNORMAL
PDW BLD-RTO: 14.4 % (ref 12.4–15.4)
PH UA: 5.5
PH UA: 5.5 (ref 5–8)
PHENCYCLIDINE SCREEN URINE: ABNORMAL
PLATELET # BLD: 331 K/UL (ref 135–450)
PMV BLD AUTO: 6.9 FL (ref 5–10.5)
POTASSIUM SERPL-SCNC: 4 MMOL/L (ref 3.5–5.1)
PROPOXYPHENE SCREEN: ABNORMAL
PROTEIN UA: NEGATIVE MG/DL
RBC # BLD: 4.4 M/UL (ref 4–5.2)
RBC UA: 1 /HPF (ref 0–4)
SALICYLATE, SERUM: 1.3 MG/DL (ref 15–30)
SODIUM BLD-SCNC: 137 MMOL/L (ref 136–145)
SPECIFIC GRAVITY UA: 1.02 (ref 1–1.03)
TOTAL PROTEIN: 8.1 G/DL (ref 6.4–8.2)
URINE REFLEX TO CULTURE: YES
URINE TYPE: ABNORMAL
UROBILINOGEN, URINE: 0.2 E.U./DL
WBC # BLD: 8.5 K/UL (ref 4–11)
WBC UA: 6 /HPF (ref 0–5)

## 2020-04-16 PROCEDURE — 80053 COMPREHEN METABOLIC PANEL: CPT

## 2020-04-16 PROCEDURE — 85025 COMPLETE CBC W/AUTO DIFF WBC: CPT

## 2020-04-16 PROCEDURE — 80307 DRUG TEST PRSMV CHEM ANLYZR: CPT

## 2020-04-16 PROCEDURE — 84703 CHORIONIC GONADOTROPIN ASSAY: CPT

## 2020-04-16 PROCEDURE — 87186 SC STD MICRODIL/AGAR DIL: CPT

## 2020-04-16 PROCEDURE — 99281 EMR DPT VST MAYX REQ PHY/QHP: CPT

## 2020-04-16 PROCEDURE — 87086 URINE CULTURE/COLONY COUNT: CPT

## 2020-04-16 PROCEDURE — 81001 URINALYSIS AUTO W/SCOPE: CPT

## 2020-04-16 PROCEDURE — G0480 DRUG TEST DEF 1-7 CLASSES: HCPCS

## 2020-04-16 PROCEDURE — 87077 CULTURE AEROBIC IDENTIFY: CPT

## 2020-04-16 RX ORDER — QUETIAPINE FUMARATE 100 MG/1
100 TABLET, FILM COATED ORAL NIGHTLY
COMMUNITY
End: 2020-06-02

## 2020-04-16 ASSESSMENT — ENCOUNTER SYMPTOMS
VOMITING: 0
DIARRHEA: 0
ABDOMINAL PAIN: 0
CHEST TIGHTNESS: 0
NAUSEA: 0
SHORTNESS OF BREATH: 0

## 2020-04-17 NOTE — ED PROVIDER NOTES
131/93 99.1 °F (37.3 °C) Oral 122 22 97 % 5' 6\" (1.676 m) 172 lb (78 kg)       Physical Exam  Vitals signs and nursing note reviewed. Constitutional:       Appearance: She is well-developed. She is not diaphoretic. HENT:      Head: Normocephalic and atraumatic. Right Ear: External ear normal.      Left Ear: External ear normal.   Eyes:      General:         Right eye: No discharge. Left eye: No discharge. Neck:      Musculoskeletal: Normal range of motion and neck supple. Vascular: No JVD. Cardiovascular:      Rate and Rhythm: Tachycardia present. Pulses: Normal pulses. Heart sounds: Normal heart sounds. Pulmonary:      Effort: Pulmonary effort is normal. No respiratory distress. Breath sounds: Normal breath sounds. Abdominal:      Palpations: Abdomen is soft. Musculoskeletal: Normal range of motion. Skin:     General: Skin is warm and dry. Coloration: Skin is not pale. Neurological:      Mental Status: She is alert and oriented to person, place, and time.    Psychiatric:         Behavior: Behavior normal.         DIAGNOSTIC RESULTS   LABS:    Labs Reviewed   COMPREHENSIVE METABOLIC PANEL - Abnormal; Notable for the following components:       Result Value    Chloride 97 (*)      (*)      (*)     All other components within normal limits    Narrative:     Performed at:  [facility]  [address] [facility], [address]   Phone [phone]   [address] - Abnormal; Notable for the following components:    Amphetamine Screen, Urine POSITIVE (*)     Benzodiazepine Screen, Urine POSITIVE (*)     Cannabinoid Scrn, Ur POSITIVE (*)     Cocaine Metabolite Screen, Urine POSITIVE (*)     Opiate Scrn, Ur POSITIVE (*)     All other components within normal limits    Narrative:     Performed at:  [facility]  [address] [facility], [address]   Phone [phone] images such as CT, Ultrasound and MRI are read by the radiologist. Plain radiographic images are visualized and preliminarily interpreted by the ED Provider with the below findings:        Interpretation per the Radiologist below, if available at the time of this note:    No orders to display     No results found. PROCEDURES   Unless otherwise noted below, none     Procedures    CRITICAL CARE TIME   N/A    CONSULTS:  None      EMERGENCY DEPARTMENT COURSE and DIFFERENTIAL DIAGNOSIS/MDM:   Vitals:    Vitals:    04/16/20 1730 04/16/20 1745 04/16/20 1800 04/16/20 1815   BP: (!) 142/83 (!) 131/92 110/67 118/75   Pulse: 108 111 106 107   Resp: 21 15 12 21   Temp:       TempSrc:       SpO2: 100% 100% 99% 100%   Weight:       Height:           Patient was given the following medications:  Medications - No data to display    Briefly, this is a 28year old female that presents to the emergency department today to be cleared for drug rehab. The patient reports that she had benzodiazepine in her system and that she would not be accepted without any ER visit. The patient reports that she was given benzos and rehab last week, states that she does not use regularly. She does not recreationally use benzos. She does use heroin and crack/cocaine daily. Last use today. States that she would like to be cleared for rehab. Labs are unremarkable. Discharged to rehab. Low clinical suspicion of benzo withdrawal.      Return for any new or worsening symptoms. I estimate there is LOW risk for SUICIDAL BEHAVIOR, HOMICIDAL BEHAVIOR, PSYCHOSIS, DANGEROUS OR VIOLENT BEHAVIOR, DISORIENTATION, OR MENTAL HEALTH CONDITION REQUIRING HOSPITALIZATION, thus I consider the discharge disposition reasonable. FINAL IMPRESSION      1.  Drug use          DISPOSITION/PLAN   DISPOSITION Decision To Discharge 04/16/2020 06:34:17 PM      PATIENT REFERREDTO:  go to [facility]            DISCHARGE MEDICATIONS:  Discharge Medication List as of 4/16/2020  6:41 PM          DISCONTINUED MEDICATIONS:  Discharge Medication List as of 4/16/2020  6:41 PM                 (Please note that portions of this note were completed with a voice recognition program.  Efforts were made to edit the dictations but occasionally words are mis-transcribed.)    SUZETTE Pearl CNP (electronically signed)           SUZETTE Pearl CNP  04/16/20 2018

## 2020-04-18 LAB
ORGANISM: ABNORMAL
URINE CULTURE, ROUTINE: ABNORMAL
URINE CULTURE, ROUTINE: ABNORMAL

## 2020-06-01 ENCOUNTER — APPOINTMENT (OUTPATIENT)
Dept: GENERAL RADIOLOGY | Age: 35
DRG: 383 | End: 2020-06-01
Payer: COMMERCIAL

## 2020-06-01 ENCOUNTER — HOSPITAL ENCOUNTER (EMERGENCY)
Age: 35
Discharge: HOME | DRG: 383 | End: 2020-06-01
Attending: EMERGENCY MEDICINE
Payer: COMMERCIAL

## 2020-06-01 VITALS
HEART RATE: 81 BPM | RESPIRATION RATE: 12 BRPM | OXYGEN SATURATION: 100 % | TEMPERATURE: 98.9 F | DIASTOLIC BLOOD PRESSURE: 66 MMHG | SYSTOLIC BLOOD PRESSURE: 103 MMHG

## 2020-06-01 LAB
A/G RATIO: 1.7 (ref 1.1–2.2)
ALBUMIN SERPL-MCNC: 4.1 G/DL (ref 3.4–5)
ALP BLD-CCNC: 51 U/L (ref 40–129)
ALT SERPL-CCNC: 11 U/L (ref 10–40)
ANION GAP SERPL CALCULATED.3IONS-SCNC: 11 MMOL/L (ref 3–16)
AST SERPL-CCNC: 17 U/L (ref 15–37)
BASOPHILS ABSOLUTE: 0 K/UL (ref 0–0.2)
BASOPHILS RELATIVE PERCENT: 0.8 %
BILIRUB SERPL-MCNC: 0.4 MG/DL (ref 0–1)
BUN BLDV-MCNC: 22 MG/DL (ref 7–20)
CALCIUM SERPL-MCNC: 9.2 MG/DL (ref 8.3–10.6)
CHLORIDE BLD-SCNC: 108 MMOL/L (ref 99–110)
CO2: 25 MMOL/L (ref 21–32)
CREAT SERPL-MCNC: 0.6 MG/DL (ref 0.6–1.1)
EOSINOPHILS ABSOLUTE: 0.2 K/UL (ref 0–0.6)
EOSINOPHILS RELATIVE PERCENT: 2.6 %
GFR AFRICAN AMERICAN: >60
GFR NON-AFRICAN AMERICAN: >60
GLOBULIN: 2.4 G/DL
GLUCOSE BLD-MCNC: 96 MG/DL (ref 70–99)
HCG QUALITATIVE: NEGATIVE
HCT VFR BLD CALC: 36.6 % (ref 36–48)
HEMOGLOBIN: 12.1 G/DL (ref 12–16)
LYMPHOCYTES ABSOLUTE: 1.6 K/UL (ref 1–5.1)
LYMPHOCYTES RELATIVE PERCENT: 26.1 %
MAGNESIUM: 2 MG/DL (ref 1.8–2.4)
MCH RBC QN AUTO: 29.4 PG (ref 26–34)
MCHC RBC AUTO-ENTMCNC: 33 G/DL (ref 31–36)
MCV RBC AUTO: 89.1 FL (ref 80–100)
MONOCYTES ABSOLUTE: 0.4 K/UL (ref 0–1.3)
MONOCYTES RELATIVE PERCENT: 6.5 %
NEUTROPHILS ABSOLUTE: 3.8 K/UL (ref 1.7–7.7)
NEUTROPHILS RELATIVE PERCENT: 64 %
PDW BLD-RTO: 16.5 % (ref 12.4–15.4)
PLATELET # BLD: 206 K/UL (ref 135–450)
PMV BLD AUTO: 7.9 FL (ref 5–10.5)
POTASSIUM REFLEX MAGNESIUM: 3.5 MMOL/L (ref 3.5–5.1)
RBC # BLD: 4.1 M/UL (ref 4–5.2)
SODIUM BLD-SCNC: 144 MMOL/L (ref 136–145)
TOTAL PROTEIN: 6.5 G/DL (ref 6.4–8.2)
WBC # BLD: 6 K/UL (ref 4–11)

## 2020-06-01 PROCEDURE — 99283 EMERGENCY DEPT VISIT LOW MDM: CPT

## 2020-06-01 PROCEDURE — 6370000000 HC RX 637 (ALT 250 FOR IP): Performed by: EMERGENCY MEDICINE

## 2020-06-01 PROCEDURE — 36415 COLL VENOUS BLD VENIPUNCTURE: CPT

## 2020-06-01 PROCEDURE — 2500000003 HC RX 250 WO HCPCS: Performed by: EMERGENCY MEDICINE

## 2020-06-01 PROCEDURE — 73130 X-RAY EXAM OF HAND: CPT

## 2020-06-01 PROCEDURE — 83735 ASSAY OF MAGNESIUM: CPT

## 2020-06-01 PROCEDURE — 85025 COMPLETE CBC W/AUTO DIFF WBC: CPT

## 2020-06-01 PROCEDURE — 80053 COMPREHEN METABOLIC PANEL: CPT

## 2020-06-01 PROCEDURE — 2580000003 HC RX 258: Performed by: EMERGENCY MEDICINE

## 2020-06-01 PROCEDURE — 96366 THER/PROPH/DIAG IV INF ADDON: CPT

## 2020-06-01 PROCEDURE — 96365 THER/PROPH/DIAG IV INF INIT: CPT

## 2020-06-01 PROCEDURE — 84703 CHORIONIC GONADOTROPIN ASSAY: CPT

## 2020-06-01 PROCEDURE — 96368 THER/DIAG CONCURRENT INF: CPT

## 2020-06-01 RX ORDER — CLINDAMYCIN HYDROCHLORIDE 150 MG/1
450 CAPSULE ORAL 3 TIMES DAILY
Qty: 90 CAPSULE | Refills: 0 | Status: ON HOLD | OUTPATIENT
Start: 2020-06-01 | End: 2020-06-04 | Stop reason: HOSPADM

## 2020-06-01 RX ORDER — SODIUM CHLORIDE, SODIUM LACTATE, POTASSIUM CHLORIDE, CALCIUM CHLORIDE 600; 310; 30; 20 MG/100ML; MG/100ML; MG/100ML; MG/100ML
1000 INJECTION, SOLUTION INTRAVENOUS ONCE
Status: COMPLETED | OUTPATIENT
Start: 2020-06-01 | End: 2020-06-01

## 2020-06-01 RX ORDER — ACETAMINOPHEN 500 MG
1000 TABLET ORAL ONCE
Status: COMPLETED | OUTPATIENT
Start: 2020-06-01 | End: 2020-06-01

## 2020-06-01 RX ORDER — CLINDAMYCIN PHOSPHATE 600 MG/50ML
600 INJECTION INTRAVENOUS ONCE
Status: COMPLETED | OUTPATIENT
Start: 2020-06-01 | End: 2020-06-01

## 2020-06-01 RX ADMIN — SODIUM CHLORIDE, POTASSIUM CHLORIDE, SODIUM LACTATE AND CALCIUM CHLORIDE 1000 ML: 600; 310; 30; 20 INJECTION, SOLUTION INTRAVENOUS at 08:49

## 2020-06-01 RX ADMIN — ACETAMINOPHEN 1000 MG: 500 TABLET, FILM COATED ORAL at 08:48

## 2020-06-01 RX ADMIN — CLINDAMYCIN PHOSPHATE 600 MG: 600 INJECTION, SOLUTION INTRAVENOUS at 09:39

## 2020-06-01 ASSESSMENT — PAIN DESCRIPTION - ORIENTATION: ORIENTATION: LEFT

## 2020-06-01 ASSESSMENT — PAIN DESCRIPTION - PAIN TYPE: TYPE: ACUTE PAIN

## 2020-06-01 ASSESSMENT — PAIN SCALES - GENERAL
PAINLEVEL_OUTOF10: 7
PAINLEVEL_OUTOF10: 9
PAINLEVEL_OUTOF10: 7
PAINLEVEL_OUTOF10: 7

## 2020-06-01 ASSESSMENT — PAIN DESCRIPTION - LOCATION: LOCATION: HAND

## 2020-06-01 ASSESSMENT — PAIN DESCRIPTION - PROGRESSION: CLINICAL_PROGRESSION: NOT CHANGED

## 2020-06-01 ASSESSMENT — PAIN DESCRIPTION - DESCRIPTORS: DESCRIPTORS: THROBBING

## 2020-06-01 ASSESSMENT — PAIN DESCRIPTION - ONSET: ONSET: GRADUAL

## 2020-06-01 ASSESSMENT — PAIN DESCRIPTION - FREQUENCY: FREQUENCY: CONTINUOUS

## 2020-06-01 NOTE — ED TRIAGE NOTES
Pt has a history of IV drug use, she states that she relapsed last week, pt started yesterday with left hand swelling and tenderness.  Radial pulse is present, and car refill < 3

## 2020-06-01 NOTE — ED PROVIDER NOTES
Transportation needs     Medical: Not on file     Non-medical: Not on file   Tobacco Use    Smoking status: Never Smoker    Smokeless tobacco: Never Used   Substance and Sexual Activity    Alcohol use: No    Drug use: Yes    Sexual activity: Not on file   Lifestyle    Physical activity     Days per week: Not on file     Minutes per session: Not on file    Stress: Not on file   Relationships    Social connections     Talks on phone: Not on file     Gets together: Not on file     Attends [employer] service: Not on file     Active member of club or organization: Not on file     Attends meetings of clubs or organizations: Not on file     Relationship status: Not on file    Intimate partner violence     Fear of current or ex partner: Not on file     Emotionally abused: Not on file     Physically abused: Not on file     Forced sexual activity: Not on file   Other Topics Concern    Not on file   Social History Narrative    Not on file     Current Facility-Administered Medications   Medication Dose Route Frequency Provider Last Rate Last Dose    clindamycin (CLEOCIN) 600 mg in dextrose 5 % 50 mL IVPB  600 mg Intravenous Once Ag Mott  mL/hr at 06/01/20 0939 600 mg at 06/01/20 9440     Current Outpatient Medications   Medication Sig Dispense Refill    clindamycin (CLEOCIN) 150 MG capsule Take 3 capsules by mouth 3 times daily for 10 days 90 capsule 0    QUEtiapine (SEROQUEL) 100 MG tablet Take 100 mg by mouth nightly      naproxen (NAPROSYN) 500 MG tablet Take 1 tablet by mouth 2 times daily (with meals) for 20 doses Do not take with ibuprofen or other NSAIDs or anti-inflammatories 20 tablet 0    acetaminophen (APAP EXTRA STRENGTH) 500 MG tablet Take 2 tablets by mouth every 6 hours as needed for Pain DO NOT TAKE WITH OTHER MEDICATIONS CONTAINING ACETAMINOPHEN.  30 tablet 0    Magic Mouthwash (MIRACLE MOUTHWASH) Swish and spit 5 mLs 4 times daily as needed for Pain Equal parts 2% lidocaine, care physician. She was offered admission but again has a strong preference to go home. At this point there is no indication for I&D and she understands the risks and benefits of admission versus discharge with outpatient management. It does seem reasonable to discharge her at this time at her preference with her acknowledgment of the high risk nature of hand infections as that she will closely be monitoring this at home. Drug use in the future was also discouraged. Patient was given scripts for the following medications. I counseled patient how to take these medications. New Prescriptions    CLINDAMYCIN (CLEOCIN) 150 MG CAPSULE    Take 3 capsules by mouth 3 times daily for 10 days         CLINICAL IMPRESSION  1. Cellulitis of right hand    2. History of intravenous drug abuse (HCC)        Blood pressure 103/66, pulse 93, temperature 99.6 °F (37.6 °C), temperature source Oral, resp. rate 12, SpO2 100 %. DISPOSITION    I have discussed the findings of today's workup with the patient and addressed the patient's questions and concerns. Important warning signs as well as new or worsening symptoms which would necessitate immediate return to the ED were discussed. The plan is to discharge from the ED at this time, and the patient is in stable condition. The patient acknowledged understanding is agreeable with this plan. Follow-up with:  [facility] ([facility]) Pre-Services  [place] [address]  [phone]  Go to   If symptoms worsen    Evan Barboza MD  [facility]  [address] [place] [address]  [phone]    Schedule an appointment as soon as possible for a visit in 2 days  For wound re-check, For symptom re-evaluation      This chart was created using Dragon dictation software. Efforts were made by me to ensure accuracy, however some errors may be present due to limitations of this technology.
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
## 2020-06-01 NOTE — ED NOTES
Pt was in the restroom for 20 mins, she came out teary eyed she states that she is just emotional because a pt  In the department is hollering out in pain.  While RN placing pts IV she feel asleep and her body jerked, she woke herself up and states I do that  About every hr, pt did it multiple times, I asked pt if she did any drugs while in the restroom, she denies doing drugs, she states she has just been having a crazy week, and this is the first time she has had a quite moment, pt placed on monitor      Wm Coates, ROB  06/01/20 1731

## 2020-06-02 ENCOUNTER — HOSPITAL ENCOUNTER (INPATIENT)
Age: 35
LOS: 2 days | Discharge: HOME | DRG: 383 | End: 2020-06-04
Attending: INTERNAL MEDICINE | Admitting: INTERNAL MEDICINE
Payer: COMMERCIAL

## 2020-06-02 ENCOUNTER — APPOINTMENT (OUTPATIENT)
Dept: CT IMAGING | Age: 35
DRG: 383 | End: 2020-06-02
Payer: COMMERCIAL

## 2020-06-02 PROBLEM — L03.90 CELLULITIS: Status: ACTIVE | Noted: 2020-06-02

## 2020-06-02 LAB
A/G RATIO: 1.1 (ref 1.1–2.2)
ALBUMIN SERPL-MCNC: 3.5 G/DL (ref 3.4–5)
ALP BLD-CCNC: 75 U/L (ref 40–129)
ALT SERPL-CCNC: 39 U/L (ref 10–40)
ANION GAP SERPL CALCULATED.3IONS-SCNC: 10 MMOL/L (ref 3–16)
AST SERPL-CCNC: 25 U/L (ref 15–37)
BASOPHILS ABSOLUTE: 0 K/UL (ref 0–0.2)
BASOPHILS RELATIVE PERCENT: 0.3 %
BILIRUB SERPL-MCNC: 0.3 MG/DL (ref 0–1)
BUN BLDV-MCNC: 6 MG/DL (ref 7–20)
CALCIUM SERPL-MCNC: 8.5 MG/DL (ref 8.3–10.6)
CHLORIDE BLD-SCNC: 100 MMOL/L (ref 99–110)
CO2: 25 MMOL/L (ref 21–32)
CREAT SERPL-MCNC: 0.6 MG/DL (ref 0.6–1.1)
EKG ATRIAL RATE: 86 BPM
EKG DIAGNOSIS: NORMAL
EKG P AXIS: 68 DEGREES
EKG P-R INTERVAL: 132 MS
EKG Q-T INTERVAL: 374 MS
EKG QRS DURATION: 106 MS
EKG QTC CALCULATION (BAZETT): 447 MS
EKG R AXIS: 45 DEGREES
EKG T AXIS: 47 DEGREES
EKG VENTRICULAR RATE: 86 BPM
EOSINOPHILS ABSOLUTE: 0.1 K/UL (ref 0–0.6)
EOSINOPHILS RELATIVE PERCENT: 0.7 %
GFR AFRICAN AMERICAN: >60
GFR NON-AFRICAN AMERICAN: >60
GLOBULIN: 3.2 G/DL
GLUCOSE BLD-MCNC: 115 MG/DL (ref 70–99)
HCT VFR BLD CALC: 33.2 % (ref 36–48)
HEMOGLOBIN: 11.1 G/DL (ref 12–16)
LACTIC ACID, SEPSIS: 0.5 MMOL/L (ref 0.4–1.9)
LACTIC ACID, SEPSIS: 0.5 MMOL/L (ref 0.4–1.9)
LYMPHOCYTES ABSOLUTE: 2.7 K/UL (ref 1–5.1)
LYMPHOCYTES RELATIVE PERCENT: 22.6 %
MAGNESIUM: 2 MG/DL (ref 1.8–2.4)
MCH RBC QN AUTO: 28.5 PG (ref 26–34)
MCHC RBC AUTO-ENTMCNC: 33.5 G/DL (ref 31–36)
MCV RBC AUTO: 85.2 FL (ref 80–100)
MONOCYTES ABSOLUTE: 1.1 K/UL (ref 0–1.3)
MONOCYTES RELATIVE PERCENT: 9.4 %
NEUTROPHILS ABSOLUTE: 8.1 K/UL (ref 1.7–7.7)
NEUTROPHILS RELATIVE PERCENT: 67 %
PDW BLD-RTO: 14.1 % (ref 12.4–15.4)
PLATELET # BLD: 299 K/UL (ref 135–450)
PMV BLD AUTO: 7.6 FL (ref 5–10.5)
POTASSIUM REFLEX MAGNESIUM: 3.2 MMOL/L (ref 3.5–5.1)
RBC # BLD: 3.89 M/UL (ref 4–5.2)
SODIUM BLD-SCNC: 135 MMOL/L (ref 136–145)
TOTAL PROTEIN: 6.7 G/DL (ref 6.4–8.2)
WBC # BLD: 12.1 K/UL (ref 4–11)

## 2020-06-02 PROCEDURE — 6360000004 HC RX CONTRAST MEDICATION: Performed by: PHYSICIAN ASSISTANT

## 2020-06-02 PROCEDURE — 2580000003 HC RX 258: Performed by: PHYSICIAN ASSISTANT

## 2020-06-02 PROCEDURE — G0378 HOSPITAL OBSERVATION PER HR: HCPCS

## 2020-06-02 PROCEDURE — 36415 COLL VENOUS BLD VENIPUNCTURE: CPT

## 2020-06-02 PROCEDURE — 96365 THER/PROPH/DIAG IV INF INIT: CPT

## 2020-06-02 PROCEDURE — 83735 ASSAY OF MAGNESIUM: CPT

## 2020-06-02 PROCEDURE — 93005 ELECTROCARDIOGRAM TRACING: CPT | Performed by: STUDENT IN AN ORGANIZED HEALTH CARE EDUCATION/TRAINING PROGRAM

## 2020-06-02 PROCEDURE — 6360000002 HC RX W HCPCS: Performed by: INTERNAL MEDICINE

## 2020-06-02 PROCEDURE — 96366 THER/PROPH/DIAG IV INF ADDON: CPT

## 2020-06-02 PROCEDURE — 73201 CT UPPER EXTREMITY W/DYE: CPT

## 2020-06-02 PROCEDURE — 96376 TX/PRO/DX INJ SAME DRUG ADON: CPT

## 2020-06-02 PROCEDURE — 87040 BLOOD CULTURE FOR BACTERIA: CPT

## 2020-06-02 PROCEDURE — 99285 EMERGENCY DEPT VISIT HI MDM: CPT

## 2020-06-02 PROCEDURE — 83605 ASSAY OF LACTIC ACID: CPT

## 2020-06-02 PROCEDURE — 1200000000 HC SEMI PRIVATE

## 2020-06-02 PROCEDURE — 6370000000 HC RX 637 (ALT 250 FOR IP): Performed by: INTERNAL MEDICINE

## 2020-06-02 PROCEDURE — 96367 TX/PROPH/DG ADDL SEQ IV INF: CPT

## 2020-06-02 PROCEDURE — 2580000003 HC RX 258: Performed by: INTERNAL MEDICINE

## 2020-06-02 PROCEDURE — 93010 ELECTROCARDIOGRAM REPORT: CPT | Performed by: INTERNAL MEDICINE

## 2020-06-02 PROCEDURE — 94760 N-INVAS EAR/PLS OXIMETRY 1: CPT

## 2020-06-02 PROCEDURE — 87641 MR-STAPH DNA AMP PROBE: CPT

## 2020-06-02 PROCEDURE — 96375 TX/PRO/DX INJ NEW DRUG ADDON: CPT

## 2020-06-02 PROCEDURE — 80053 COMPREHEN METABOLIC PANEL: CPT

## 2020-06-02 PROCEDURE — 6360000002 HC RX W HCPCS: Performed by: PHYSICIAN ASSISTANT

## 2020-06-02 PROCEDURE — 85025 COMPLETE CBC W/AUTO DIFF WBC: CPT

## 2020-06-02 RX ORDER — QUETIAPINE FUMARATE 25 MG/1
50 TABLET, FILM COATED ORAL NIGHTLY PRN
Status: DISCONTINUED | OUTPATIENT
Start: 2020-06-02 | End: 2020-06-04 | Stop reason: HOSPADM

## 2020-06-02 RX ORDER — POLYETHYLENE GLYCOL 3350 17 G/17G
17 POWDER, FOR SOLUTION ORAL DAILY PRN
Status: DISCONTINUED | OUTPATIENT
Start: 2020-06-02 | End: 2020-06-04 | Stop reason: HOSPADM

## 2020-06-02 RX ORDER — ACETAMINOPHEN 650 MG/1
650 SUPPOSITORY RECTAL EVERY 6 HOURS PRN
Status: DISCONTINUED | OUTPATIENT
Start: 2020-06-02 | End: 2020-06-04 | Stop reason: HOSPADM

## 2020-06-02 RX ORDER — IBUPROFEN 400 MG/1
400 TABLET ORAL EVERY 6 HOURS PRN
COMMUNITY
End: 2022-01-18 | Stop reason: ALTCHOICE

## 2020-06-02 RX ORDER — QUETIAPINE FUMARATE 100 MG/1
100 TABLET, FILM COATED ORAL NIGHTLY PRN
COMMUNITY
End: 2022-01-18 | Stop reason: ALTCHOICE

## 2020-06-02 RX ORDER — PROMETHAZINE HYDROCHLORIDE 25 MG/1
12.5 TABLET ORAL EVERY 6 HOURS PRN
Status: DISCONTINUED | OUTPATIENT
Start: 2020-06-02 | End: 2020-06-04 | Stop reason: HOSPADM

## 2020-06-02 RX ORDER — CLONIDINE HYDROCHLORIDE 0.1 MG/1
0.1 TABLET ORAL PRN
Status: DISCONTINUED | OUTPATIENT
Start: 2020-06-02 | End: 2020-06-04 | Stop reason: HOSPADM

## 2020-06-02 RX ORDER — NICOTINE 21 MG/24HR
1 PATCH, TRANSDERMAL 24 HOURS TRANSDERMAL DAILY
Status: DISCONTINUED | OUTPATIENT
Start: 2020-06-02 | End: 2020-06-04 | Stop reason: HOSPADM

## 2020-06-02 RX ORDER — KETOROLAC TROMETHAMINE 30 MG/ML
30 INJECTION, SOLUTION INTRAMUSCULAR; INTRAVENOUS EVERY 6 HOURS PRN
Status: DISCONTINUED | OUTPATIENT
Start: 2020-06-02 | End: 2020-06-04 | Stop reason: HOSPADM

## 2020-06-02 RX ORDER — 0.9 % SODIUM CHLORIDE 0.9 %
2500 INTRAVENOUS SOLUTION INTRAVENOUS ONCE
Status: COMPLETED | OUTPATIENT
Start: 2020-06-02 | End: 2020-06-02

## 2020-06-02 RX ORDER — HYDROXYZINE PAMOATE 100 MG/1
100 CAPSULE ORAL EVERY 6 HOURS PRN
COMMUNITY
End: 2022-01-18 | Stop reason: ALTCHOICE

## 2020-06-02 RX ORDER — ONDANSETRON 2 MG/ML
4 INJECTION INTRAMUSCULAR; INTRAVENOUS EVERY 6 HOURS PRN
Status: DISCONTINUED | OUTPATIENT
Start: 2020-06-02 | End: 2020-06-04 | Stop reason: HOSPADM

## 2020-06-02 RX ORDER — TRAMADOL HYDROCHLORIDE 50 MG/1
50 TABLET ORAL PRN
Status: DISCONTINUED | OUTPATIENT
Start: 2020-06-02 | End: 2020-06-04 | Stop reason: HOSPADM

## 2020-06-02 RX ORDER — SODIUM CHLORIDE 0.9 % (FLUSH) 0.9 %
10 SYRINGE (ML) INJECTION EVERY 12 HOURS SCHEDULED
Status: DISCONTINUED | OUTPATIENT
Start: 2020-06-02 | End: 2020-06-04 | Stop reason: HOSPADM

## 2020-06-02 RX ORDER — GABAPENTIN 300 MG/1
300 CAPSULE ORAL EVERY 8 HOURS PRN
Status: DISCONTINUED | OUTPATIENT
Start: 2020-06-02 | End: 2020-06-04 | Stop reason: HOSPADM

## 2020-06-02 RX ORDER — ACETAMINOPHEN 325 MG/1
650 TABLET ORAL EVERY 6 HOURS PRN
Status: DISCONTINUED | OUTPATIENT
Start: 2020-06-02 | End: 2020-06-04 | Stop reason: HOSPADM

## 2020-06-02 RX ORDER — SODIUM CHLORIDE 0.9 % (FLUSH) 0.9 %
10 SYRINGE (ML) INJECTION PRN
Status: DISCONTINUED | OUTPATIENT
Start: 2020-06-02 | End: 2020-06-04 | Stop reason: HOSPADM

## 2020-06-02 RX ADMIN — KETOROLAC TROMETHAMINE 30 MG: 30 INJECTION, SOLUTION INTRAMUSCULAR at 17:41

## 2020-06-02 RX ADMIN — IOPAMIDOL 75 ML: 755 INJECTION, SOLUTION INTRAVENOUS at 13:55

## 2020-06-02 RX ADMIN — AMPICILLIN SODIUM AND SULBACTAM SODIUM 1.5 G: 1; .5 INJECTION, POWDER, FOR SOLUTION INTRAMUSCULAR; INTRAVENOUS at 23:05

## 2020-06-02 RX ADMIN — SODIUM CHLORIDE, PRESERVATIVE FREE 10 ML: 5 INJECTION INTRAVENOUS at 23:02

## 2020-06-02 RX ADMIN — SODIUM CHLORIDE 2500 ML: 9 INJECTION, SOLUTION INTRAVENOUS at 12:50

## 2020-06-02 RX ADMIN — CEFAZOLIN SODIUM 1 G: 1 INJECTION, POWDER, FOR SOLUTION INTRAMUSCULAR; INTRAVENOUS at 13:00

## 2020-06-02 RX ADMIN — AMPICILLIN SODIUM AND SULBACTAM SODIUM 1.5 G: 1; .5 INJECTION, POWDER, FOR SOLUTION INTRAMUSCULAR; INTRAVENOUS at 18:01

## 2020-06-02 RX ADMIN — VANCOMYCIN HYDROCHLORIDE 1250 MG: 10 INJECTION, POWDER, LYOPHILIZED, FOR SOLUTION INTRAVENOUS at 13:43

## 2020-06-02 RX ADMIN — KETOROLAC TROMETHAMINE 30 MG: 30 INJECTION, SOLUTION INTRAMUSCULAR at 23:03

## 2020-06-02 RX ADMIN — Medication 10 ML: at 17:41

## 2020-06-02 RX ADMIN — QUETIAPINE FUMARATE 50 MG: 25 TABLET ORAL at 23:04

## 2020-06-02 ASSESSMENT — PAIN DESCRIPTION - LOCATION
LOCATION: HAND

## 2020-06-02 ASSESSMENT — PAIN DESCRIPTION - ORIENTATION
ORIENTATION: RIGHT

## 2020-06-02 ASSESSMENT — PAIN DESCRIPTION - PAIN TYPE
TYPE: ACUTE PAIN

## 2020-06-02 ASSESSMENT — PAIN DESCRIPTION - DESCRIPTORS
DESCRIPTORS: BURNING
DESCRIPTORS: DISCOMFORT;CONSTANT

## 2020-06-02 ASSESSMENT — PAIN SCALES - GENERAL
PAINLEVEL_OUTOF10: 9
PAINLEVEL_OUTOF10: 5
PAINLEVEL_OUTOF10: 8
PAINLEVEL_OUTOF10: 8

## 2020-06-02 ASSESSMENT — PAIN - FUNCTIONAL ASSESSMENT
PAIN_FUNCTIONAL_ASSESSMENT: PREVENTS OR INTERFERES SOME ACTIVE ACTIVITIES AND ADLS

## 2020-06-02 ASSESSMENT — ENCOUNTER SYMPTOMS
NAUSEA: 0
VOMITING: 0
BACK PAIN: 0
SORE THROAT: 0
COLOR CHANGE: 1
SHORTNESS OF BREATH: 0
ABDOMINAL PAIN: 0

## 2020-06-02 ASSESSMENT — PAIN DESCRIPTION - ONSET
ONSET: ON-GOING
ONSET: PROGRESSIVE
ONSET: ON-GOING
ONSET: PROGRESSIVE

## 2020-06-02 ASSESSMENT — PAIN DESCRIPTION - PROGRESSION
CLINICAL_PROGRESSION: GRADUALLY WORSENING
CLINICAL_PROGRESSION: NOT CHANGED
CLINICAL_PROGRESSION: GRADUALLY WORSENING

## 2020-06-02 ASSESSMENT — PAIN DESCRIPTION - FREQUENCY
FREQUENCY: CONTINUOUS

## 2020-06-02 NOTE — H&P
06/02/20  1249    135*   K 3.5 3.2*    100   CO2 25 25   BUN 22* 6*   CREATININE 0.6 0.6     LFT'S  Recent Labs     06/01/20  0845 06/02/20  1249   AST 17 25   ALT 11 39   BILITOT 0.4 0.3   ALKPHOS 51 75     COAG  No results for input(s): INR in the last 72 hours. CARDIAC ENZYMES  No results for input(s): CKTOTAL, CKMB, CKMBINDEX, TROPONINI in the last 72 hours. U/A:    Lab Results   Component Value Date    COLORU YELLOW 04/16/2020    WBCUA 6 04/16/2020    RBCUA 1 04/16/2020    BACTERIA 2+ 04/16/2020    CLARITYU TURBID 04/16/2020    SPECGRAV 1.024 04/16/2020    LEUKOCYTESUR Negative 04/16/2020    BLOODU Negative 04/16/2020    GLUCOSEU Negative 04/16/2020       ABG  No results found for: EQH8IIL, BEART, M9YZSEGV, PHART, THGBART, GOW5XRG, PO2ART, YAN7VVD        Active Hospital Problems    Diagnosis Date Noted    Cellulitis [L03.90] 06/02/2020         PHYSICIANS CERTIFICATION:    I certify that Lona Tiwari is expected to be hospitalized for > than 2 midnights based on the following assessment and plan:      ASSESSMENT/PLAN:    Rt hand cellulitis - started on IV unasyn/vancomycin, check MRSA nasal probe. Await blood cx, CT hand reviewed with no drainable fluid collection. Will add toradol IV prn, keep arm elevated    Suspected superficial vein thrombosis - reviewed on CT hand, monitor    IV drug abuse - per verbal report heroin/meth, states she uses everyday and does go through withdrawal. Will place on COWS protocol    Tobacco abuse - on nicotine patch    Hypokalemia - replete      DVT Prophylaxis: lovenox  Diet: No diet orders on file  Code Status: No Order  PT/OT Eval Status: not indicated    Siomara Islas MD    Thank you No primary care provider on file. for the opportunity to be involved in this patient's care. If you have any questions or concerns please feel free to contact me at [phone].

## 2020-06-02 NOTE — ED PROVIDER NOTES
pain.   Gastrointestinal: Negative for abdominal pain, nausea and vomiting. Genitourinary: Negative for difficulty urinating and dysuria. Musculoskeletal: Positive for myalgias. Negative for back pain. Skin: Positive for color change and wound. Neurological: Negative for light-headedness and headaches. Psychiatric/Behavioral: The patient is not nervous/anxious. All other systems reviewed and are negative. Except as noted above the remainder ofthe review of systems was reviewed and negative. PAST MEDICALHISTORY         Diagnosis Date    Drug abuse ([facility] [address]     Endocarditis     Hepatitis C     Lyme disease     MRSA (methicillin resistant staph aureus) culture positive 14       SURGICAL HISTORY           Procedure Laterality Date     SECTION         CURRENT MEDICATIONS       Previous Medications    ACETAMINOPHEN (APAP EXTRA STRENGTH) 500 MG TABLET    Take 2 tablets by mouth every 6 hours as needed for Pain DO NOT TAKE WITH OTHER MEDICATIONS CONTAINING ACETAMINOPHEN. CLINDAMYCIN (CLEOCIN) 150 MG CAPSULE    Take 3 capsules by mouth 3 times daily for 10 days    HYDROXYZINE (VISTARIL) 100 MG CAPSULE    Take 100 mg by mouth every 6 hours as needed for Itching or Anxiety    IBUPROFEN (ADVIL;MOTRIN) 400 MG TABLET    Take 400 mg by mouth every 6 hours as needed for Pain    QUETIAPINE (SEROQUEL) 100 MG TABLET    Take 100 mg by mouth nightly as needed (sleep / insomnia)       ALLERGIES     Patient has no known allergies. FAMILY HISTORY     No family history on file. No family status information on file. SOCIAL HISTORY    reports that she has never smoked. She has never used smokeless tobacco. She reports current drug use. She reports that she does not drink alcohol.     PHYSICAL EXAM    (up to 7 for level 4, 8 or more for level 5)     ED Triage Vitals [20 1232]   BP Temp Temp Source Pulse Resp SpO2 Height Weight   109/63 98.7 °F (37.1 °C) Oral 107 16 97 % -- 166 lb 10.7 Narrative:     Performed at:  [facility]  [address] [place] [address]   Phone [phone]   CULTURE, BLOOD 1   CULTURE, BLOOD 2   LACTATE, SEPSIS    Narrative:     Performed at:  [facility]  [address] [place] [address]   Phone [phone]   LACTATE, SEPSIS    Narrative:     Performed at:  [facility]  [address] [place] [address]   Phone [phone]   MAGNESIUM    Narrative:     Performed at:  [facility]  [address] [place] [address]   Phone [phone]   URINE DRUG SCREEN       All other labs were within normal range or not returned as of this dictation. EMERGENCY DEPARTMENT COURSE and DIFFERENTIAL DIAGNOSIS/MDM:   Vitals:    Vitals:    06/02/20 1445 06/02/20 1500 06/02/20 1515 06/02/20 1530   BP: 108/72   105/68   Pulse: 77 81 80 77   Resp: 18 17 17 19   Temp:    98.5 °F (36.9 °C)   TempSrc:    Oral   SpO2: 98% 98% 98% 97%   Weight:            I have evaluated this patient. My supervising physician was available for consultation. She is nontoxic and afebrile. She is neurovascularly intact. There does not appear to be a drainable fluid collection at this time. Patient was given fluids and treated with vancomycin. She was found to have elevated white blood cell count of 12.1, this has increased from 6.0 yesterday. Her lactic acid was 0.5. Renal function normal.  X-ray was obtained yesterday, I opted to perform hand CT today and this showed extensive dorsal subcutaneous fat stranding compatible with cellulitis but no drainable fluid collection. She also has what appears to be superficial venous thrombosis. There is no bony involvement. Patient will be admitted to the hospitalist for further inpatient antibiotics. PROCEDURES:  None    FINAL IMPRESSION      1. Cellulitis of right hand    2.  Superficial

## 2020-06-02 NOTE — ED NOTES
Report called to   Nat Lomeli RN   To Room  7502   Cardiac monitor on during transfer  Pt's pain level   5/10  VSS, Afebrile   IV site is clean, dry and intact, Normal saline locked   Family updated on transfer            Nery Sears RN  06/02/20 2473

## 2020-06-02 NOTE — ED NOTES
Pt resting in bed at this time. Call light remains in reach instructed pt how to use, and encouraged pt to call if needed assistance, no distress noted. RR even and unlabored, skin warm and dry. No needs at this time. Will continue to monitor closely.          Lencho Wu RN  06/02/20 2240

## 2020-06-02 NOTE — FLOWSHEET NOTE
Patient admitted to 46. Patient alert and oriented and drowsy. Patient complaining of increased pain to right hand. PRN medication administered. IV to right AC dressing clean dry and intact. Call light placed within reach. Bed in lowest position. Bed alarm on. Patient has swelling on both hands, redness, tender and warm to touch to right hand. Patient able to perform active ROM. Radial Pulses palpable. Will continue to monitor.  Electronically signed by Derick Mallory RN on 6/2/2020 at 6:23 PM

## 2020-06-02 NOTE — ED TRIAGE NOTES
Pt resting in bed at this time. Call light remains in reach instructed pt how to use, and encouraged pt to call if needed assistance, no distress noted. RR even and unlabored, skin warm and dry. No needs at this time. Will continue to monitor closely.     Pt remains, drowsy, pt awakes to voice

## 2020-06-03 LAB
ANION GAP SERPL CALCULATED.3IONS-SCNC: 7 MMOL/L (ref 3–16)
BASOPHILS ABSOLUTE: 0.1 K/UL (ref 0–0.2)
BASOPHILS RELATIVE PERCENT: 0.6 %
BUN BLDV-MCNC: 4 MG/DL (ref 7–20)
CALCIUM SERPL-MCNC: 8.3 MG/DL (ref 8.3–10.6)
CHLORIDE BLD-SCNC: 110 MMOL/L (ref 99–110)
CO2: 23 MMOL/L (ref 21–32)
CREAT SERPL-MCNC: <0.5 MG/DL (ref 0.6–1.1)
EOSINOPHILS ABSOLUTE: 0.1 K/UL (ref 0–0.6)
EOSINOPHILS RELATIVE PERCENT: 1.2 %
GFR AFRICAN AMERICAN: >60
GFR NON-AFRICAN AMERICAN: >60
GLUCOSE BLD-MCNC: 106 MG/DL (ref 70–99)
HCT VFR BLD CALC: 31.9 % (ref 36–48)
HEMOGLOBIN: 10.4 G/DL (ref 12–16)
LYMPHOCYTES ABSOLUTE: 2.9 K/UL (ref 1–5.1)
LYMPHOCYTES RELATIVE PERCENT: 30 %
MAGNESIUM: 2.3 MG/DL (ref 1.8–2.4)
MCH RBC QN AUTO: 28.2 PG (ref 26–34)
MCHC RBC AUTO-ENTMCNC: 32.7 G/DL (ref 31–36)
MCV RBC AUTO: 86.3 FL (ref 80–100)
MONOCYTES ABSOLUTE: 0.9 K/UL (ref 0–1.3)
MONOCYTES RELATIVE PERCENT: 9.7 %
MRSA SCREEN RT-PCR: NORMAL
NEUTROPHILS ABSOLUTE: 5.7 K/UL (ref 1.7–7.7)
NEUTROPHILS RELATIVE PERCENT: 58.5 %
PDW BLD-RTO: 14.1 % (ref 12.4–15.4)
PLATELET # BLD: 263 K/UL (ref 135–450)
PMV BLD AUTO: 7.8 FL (ref 5–10.5)
POTASSIUM REFLEX MAGNESIUM: 3.5 MMOL/L (ref 3.5–5.1)
RBC # BLD: 3.69 M/UL (ref 4–5.2)
SODIUM BLD-SCNC: 140 MMOL/L (ref 136–145)
WBC # BLD: 9.7 K/UL (ref 4–11)

## 2020-06-03 PROCEDURE — 96376 TX/PRO/DX INJ SAME DRUG ADON: CPT

## 2020-06-03 PROCEDURE — G0378 HOSPITAL OBSERVATION PER HR: HCPCS

## 2020-06-03 PROCEDURE — 94760 N-INVAS EAR/PLS OXIMETRY 1: CPT

## 2020-06-03 PROCEDURE — 6370000000 HC RX 637 (ALT 250 FOR IP): Performed by: INTERNAL MEDICINE

## 2020-06-03 PROCEDURE — 36415 COLL VENOUS BLD VENIPUNCTURE: CPT

## 2020-06-03 PROCEDURE — 1200000000 HC SEMI PRIVATE

## 2020-06-03 PROCEDURE — 96366 THER/PROPH/DIAG IV INF ADDON: CPT

## 2020-06-03 PROCEDURE — 80048 BASIC METABOLIC PNL TOTAL CA: CPT

## 2020-06-03 PROCEDURE — 85025 COMPLETE CBC W/AUTO DIFF WBC: CPT

## 2020-06-03 PROCEDURE — 83735 ASSAY OF MAGNESIUM: CPT

## 2020-06-03 PROCEDURE — 2580000003 HC RX 258: Performed by: INTERNAL MEDICINE

## 2020-06-03 PROCEDURE — 6360000002 HC RX W HCPCS: Performed by: INTERNAL MEDICINE

## 2020-06-03 RX ADMIN — TRAMADOL HYDROCHLORIDE 50 MG: 50 TABLET, FILM COATED ORAL at 12:27

## 2020-06-03 RX ADMIN — CLONIDINE HYDROCHLORIDE 0.1 MG: 0.1 TABLET ORAL at 12:27

## 2020-06-03 RX ADMIN — AMPICILLIN SODIUM AND SULBACTAM SODIUM 1.5 G: 1; .5 INJECTION, POWDER, FOR SOLUTION INTRAMUSCULAR; INTRAVENOUS at 11:28

## 2020-06-03 RX ADMIN — PROMETHAZINE HYDROCHLORIDE 12.5 MG: 25 TABLET ORAL at 03:24

## 2020-06-03 RX ADMIN — Medication 1250 MG: at 12:17

## 2020-06-03 RX ADMIN — GABAPENTIN 300 MG: 300 CAPSULE ORAL at 03:24

## 2020-06-03 RX ADMIN — SODIUM CHLORIDE, PRESERVATIVE FREE 10 ML: 5 INJECTION INTRAVENOUS at 09:10

## 2020-06-03 RX ADMIN — AMPICILLIN SODIUM AND SULBACTAM SODIUM 1.5 G: 1; .5 INJECTION, POWDER, FOR SOLUTION INTRAMUSCULAR; INTRAVENOUS at 07:07

## 2020-06-03 RX ADMIN — Medication 10 ML: at 07:08

## 2020-06-03 RX ADMIN — KETOROLAC TROMETHAMINE 30 MG: 30 INJECTION, SOLUTION INTRAMUSCULAR at 07:08

## 2020-06-03 RX ADMIN — SODIUM CHLORIDE, PRESERVATIVE FREE 10 ML: 5 INJECTION INTRAVENOUS at 20:27

## 2020-06-03 RX ADMIN — Medication 1250 MG: at 01:44

## 2020-06-03 RX ADMIN — AMPICILLIN SODIUM AND SULBACTAM SODIUM 1.5 G: 1; .5 INJECTION, POWDER, FOR SOLUTION INTRAMUSCULAR; INTRAVENOUS at 17:38

## 2020-06-03 RX ADMIN — KETOROLAC TROMETHAMINE 30 MG: 30 INJECTION, SOLUTION INTRAMUSCULAR at 20:27

## 2020-06-03 RX ADMIN — KETOROLAC TROMETHAMINE 30 MG: 30 INJECTION, SOLUTION INTRAMUSCULAR at 14:30

## 2020-06-03 ASSESSMENT — PAIN DESCRIPTION - PROGRESSION
CLINICAL_PROGRESSION: GRADUALLY IMPROVING
CLINICAL_PROGRESSION: RAPIDLY IMPROVING
CLINICAL_PROGRESSION: GRADUALLY IMPROVING

## 2020-06-03 ASSESSMENT — PAIN SCALES - GENERAL
PAINLEVEL_OUTOF10: 8
PAINLEVEL_OUTOF10: 5
PAINLEVEL_OUTOF10: 2
PAINLEVEL_OUTOF10: 8

## 2020-06-03 ASSESSMENT — PAIN DESCRIPTION - ORIENTATION
ORIENTATION: RIGHT

## 2020-06-03 ASSESSMENT — PAIN DESCRIPTION - DESCRIPTORS
DESCRIPTORS: ACHING;CONSTANT;DISCOMFORT
DESCRIPTORS: CONSTANT;ACHING;BURNING;DISCOMFORT
DESCRIPTORS: CONSTANT;ACHING;DISCOMFORT

## 2020-06-03 ASSESSMENT — PAIN - FUNCTIONAL ASSESSMENT
PAIN_FUNCTIONAL_ASSESSMENT: PREVENTS OR INTERFERES SOME ACTIVE ACTIVITIES AND ADLS

## 2020-06-03 ASSESSMENT — PAIN DESCRIPTION - PAIN TYPE
TYPE: ACUTE PAIN

## 2020-06-03 ASSESSMENT — PAIN DESCRIPTION - ONSET
ONSET: ON-GOING

## 2020-06-03 ASSESSMENT — PAIN DESCRIPTION - LOCATION
LOCATION: HAND

## 2020-06-03 ASSESSMENT — PAIN DESCRIPTION - FREQUENCY
FREQUENCY: CONTINUOUS

## 2020-06-03 NOTE — FLOWSHEET NOTE
Patient refused nicotine patch and lovenox injection this AM. Patient verbalized understanding of the importance of medication. Patient continues to refuse. Will continue to monitor.  Electronically signed by Soraya Lira RN on 6/3/2020 at 9:37 AM

## 2020-06-04 VITALS
HEIGHT: 66 IN | WEIGHT: 165.79 LBS | SYSTOLIC BLOOD PRESSURE: 109 MMHG | TEMPERATURE: 98.3 F | HEART RATE: 86 BPM | OXYGEN SATURATION: 99 % | BODY MASS INDEX: 26.64 KG/M2 | DIASTOLIC BLOOD PRESSURE: 72 MMHG | RESPIRATION RATE: 16 BRPM

## 2020-06-04 LAB — VANCOMYCIN TROUGH: 5.9 UG/ML (ref 10–20)

## 2020-06-04 PROCEDURE — 80202 ASSAY OF VANCOMYCIN: CPT

## 2020-06-04 PROCEDURE — 6360000002 HC RX W HCPCS: Performed by: INTERNAL MEDICINE

## 2020-06-04 PROCEDURE — 94760 N-INVAS EAR/PLS OXIMETRY 1: CPT

## 2020-06-04 PROCEDURE — 96366 THER/PROPH/DIAG IV INF ADDON: CPT

## 2020-06-04 PROCEDURE — 2580000003 HC RX 258: Performed by: INTERNAL MEDICINE

## 2020-06-04 PROCEDURE — 6370000000 HC RX 637 (ALT 250 FOR IP): Performed by: INTERNAL MEDICINE

## 2020-06-04 PROCEDURE — 36415 COLL VENOUS BLD VENIPUNCTURE: CPT

## 2020-06-04 PROCEDURE — G0378 HOSPITAL OBSERVATION PER HR: HCPCS

## 2020-06-04 PROCEDURE — 96376 TX/PRO/DX INJ SAME DRUG ADON: CPT

## 2020-06-04 RX ORDER — AMOXICILLIN AND CLAVULANATE POTASSIUM 875; 125 MG/1; MG/1
1 TABLET, FILM COATED ORAL 2 TIMES DAILY
Qty: 20 TABLET | Refills: 0 | Status: SHIPPED | OUTPATIENT
Start: 2020-06-04 | End: 2020-06-14

## 2020-06-04 RX ADMIN — Medication 1250 MG: at 03:27

## 2020-06-04 RX ADMIN — KETOROLAC TROMETHAMINE 30 MG: 30 INJECTION, SOLUTION INTRAMUSCULAR at 09:01

## 2020-06-04 RX ADMIN — QUETIAPINE FUMARATE 50 MG: 25 TABLET ORAL at 01:39

## 2020-06-04 RX ADMIN — AMPICILLIN SODIUM AND SULBACTAM SODIUM 1.5 G: 1; .5 INJECTION, POWDER, FOR SOLUTION INTRAMUSCULAR; INTRAVENOUS at 00:29

## 2020-06-04 RX ADMIN — KETOROLAC TROMETHAMINE 30 MG: 30 INJECTION, SOLUTION INTRAMUSCULAR at 02:37

## 2020-06-04 RX ADMIN — AMPICILLIN SODIUM AND SULBACTAM SODIUM 1.5 G: 1; .5 INJECTION, POWDER, FOR SOLUTION INTRAMUSCULAR; INTRAVENOUS at 05:29

## 2020-06-04 RX ADMIN — GABAPENTIN 300 MG: 300 CAPSULE ORAL at 01:40

## 2020-06-04 ASSESSMENT — PAIN DESCRIPTION - DESCRIPTORS: DESCRIPTORS: ACHING;DISCOMFORT

## 2020-06-04 ASSESSMENT — PAIN SCALES - GENERAL
PAINLEVEL_OUTOF10: 8
PAINLEVEL_OUTOF10: 8

## 2020-06-04 ASSESSMENT — PAIN DESCRIPTION - ORIENTATION: ORIENTATION: RIGHT

## 2020-06-04 ASSESSMENT — PAIN DESCRIPTION - PROGRESSION: CLINICAL_PROGRESSION: GRADUALLY IMPROVING

## 2020-06-04 ASSESSMENT — PAIN DESCRIPTION - FREQUENCY: FREQUENCY: CONTINUOUS

## 2020-06-04 ASSESSMENT — PAIN DESCRIPTION - ONSET: ONSET: ON-GOING

## 2020-06-04 ASSESSMENT — PAIN DESCRIPTION - PAIN TYPE: TYPE: ACUTE PAIN

## 2020-06-04 ASSESSMENT — PAIN - FUNCTIONAL ASSESSMENT: PAIN_FUNCTIONAL_ASSESSMENT: PREVENTS OR INTERFERES SOME ACTIVE ACTIVITIES AND ADLS

## 2020-06-04 ASSESSMENT — PAIN DESCRIPTION - LOCATION: LOCATION: HAND

## 2020-06-04 NOTE — PROGRESS NOTES
IV to right AC removed. Discharge instructions reviewed with patient. Reinforced need to follow up with resources provided to her by social work. Patient agrees and understands. All questions and concerns addressed prior to discharge. Patient walked to front entrance with steady gait where her boyfriend picked her up.
heroin/meth, states she uses everyday and does go through withdrawal. Placed on COWS protocol     Tobacco abuse - on nicotine patch, pt refused     Hypokalemia - repleted        DVT Prophylaxis: lovenox (pt refused)  Diet: DIET GENERAL;  Code Status: Full Code    PT/OT Eval Status: not indicated    Dispo - poss d/c in am on PO abx    Krishna oRmo MD

## 2020-06-05 ENCOUNTER — TELEPHONE (OUTPATIENT)
Dept: PRIMARY CARE CLINIC | Age: 35
End: 2020-06-05

## 2020-06-06 LAB
BLOOD CULTURE, ROUTINE: NORMAL
CULTURE, BLOOD 2: NORMAL

## 2020-06-08 ENCOUNTER — TELEPHONE (OUTPATIENT)
Dept: ORTHOPEDIC SURGERY | Age: 35
End: 2020-06-08

## 2020-06-16 NOTE — DISCHARGE SUMMARY
Hospital Medicine Discharge Summary    Patient ID: Floyd Freeman      Patient's PCP: No primary care provider on file. Admit Date: 6/2/2020     Discharge Date: 6/4/2020      Admitting Physician: Nichole Coats MD     Discharge Physician: Nichole Coats MD     Discharge Diagnoses: Active Hospital Problems    Diagnosis    Cellulitis [L03.90]       The patient was seen and examined on day of discharge and this discharge summary is in conjunction with any daily progress note from day of discharge. Hospital Course: The patient is a 28 y.o. female who presents to [facility] with rt hand swelling. Pt has h/o IV drug abuse, states she has been having rt hand swelling/pain for the last few days, was seen in the ER yesterday for cellulitis, treated with IV clinda and d/gracie home on PO abx. States swelling and pain got worse overnight and hence presented to the ER. Admitted for IV abx. Rt hand cellulitis - started on IV unasyn/vancomycin, check MRSA nasal probe neg. neg blood cx, CT hand reviewed with no drainable fluid collection. Added toradol IV prn, keep arm elevated, d/c on PO abx     Suspected superficial vein thrombosis - reviewed on CT hand, monitor     IV drug abuse - per verbal report heroin/meth, states she uses everyday and does go through withdrawal. Placed on COWS protocol     Tobacco abuse - on nicotine patch, pt refused     Hypokalemia - repleted        Physical Exam Performed:     /72   Pulse 86   Temp 98.3 °F (36.8 °C) (Oral)   Resp 16   Ht 5' 6\" (1.676 m)   Wt 165 lb 12.6 oz (75.2 kg)   SpO2 99%   BMI 26.76 kg/m²     General appearance: No apparent distress appears stated age and cooperative. HEENT Normal cephalic, atraumatic without obvious deformity.  Pupils equal, round, and reactive to light.  Extra ocular muscles intact.  Conjunctivae/corneas clear.   Neck: Supple, No jugular venous distention/bruits.  Trachea midline without thyromegaly or adenopathy with full range of motion. Lungs: Clear to auscultation, bilaterally without Rales/Wheezes/Rhonchi with good respiratory effort. Heart: Regular rate and rhythm with Normal S1/S2   Abdomen: Soft, non-tender or non-distended without rigidity or guarding and positive bowel sounds   Extremities: No clubbing, cyanosis, or edema bilaterally. Rt dorsum of hand swolling/tender/redness improved, needle marks in b/l hands seen  Skin: Skin color, texture, turgor normal.  No rashes or lesions. Neurologic: Alert and oriented X 3, neurovascularly intact with sensory/motor intact upper extremities/lower extremities, bilaterally.  Cranial nerves: II-XII intact, grossly non-focal.  Mental status: Alert, oriented  Capillary Refill: Acceptable  < 3 seconds  Peripheral Pulses: +3 Easily felt, not easily obliterated with pressure       Labs: For convenience and continuity at follow-up the following most recent labs are provided:      CBC:    Lab Results   Component Value Date    WBC 9.7 06/03/2020    HGB 10.4 06/03/2020    HCT 31.9 06/03/2020     06/03/2020       Renal:    Lab Results   Component Value Date     06/03/2020    K 3.5 06/03/2020     06/03/2020    CO2 23 06/03/2020    BUN 4 06/03/2020    CREATININE <0.5 06/03/2020    CALCIUM 8.3 06/03/2020         Significant Diagnostic Studies    Radiology:   CT HAND RIGHT W CONTRAST   Final Result   1. Extensive dorsal subcutaneous fat stranding compatible with cellulitis. No drainable fluid collection. 2. Superficial vein dorsal to the 2nd metacarpal and 2nd intermetacarpal   space with a central filling defect compatible with superficial venous   thrombosis. 3. No acute osseous abnormality.                 Consults:     IP CONSULT TO HOSPITALIST  PHARMACY TO DOSE VANCOMYCIN  IP CONSULT TO SOCIAL WORK    Disposition:  home     Condition at Discharge: Stable    Discharge Instructions/Follow-up:  PCP in 1 week    Code Status:  Prior     Activity:

## 2021-12-02 ENCOUNTER — HOSPITAL ENCOUNTER (EMERGENCY)
Age: 36
Discharge: HOME | End: 2021-12-02
Payer: COMMERCIAL

## 2021-12-02 ENCOUNTER — APPOINTMENT (OUTPATIENT)
Dept: CT IMAGING | Age: 36
End: 2021-12-02
Payer: COMMERCIAL

## 2021-12-02 VITALS
TEMPERATURE: 97.8 F | DIASTOLIC BLOOD PRESSURE: 62 MMHG | HEART RATE: 57 BPM | WEIGHT: 175.71 LBS | BODY MASS INDEX: 28.24 KG/M2 | OXYGEN SATURATION: 100 % | HEIGHT: 66 IN | RESPIRATION RATE: 16 BRPM | SYSTOLIC BLOOD PRESSURE: 110 MMHG

## 2021-12-02 DIAGNOSIS — N30.00 ACUTE CYSTITIS WITHOUT HEMATURIA: Primary | ICD-10-CM

## 2021-12-02 LAB
A/G RATIO: 2 (ref 1.1–2.2)
ALBUMIN SERPL-MCNC: 4.5 G/DL (ref 3.4–5)
ALP BLD-CCNC: 69 U/L (ref 40–129)
ALT SERPL-CCNC: 8 U/L (ref 10–40)
ANION GAP SERPL CALCULATED.3IONS-SCNC: 10 MMOL/L (ref 3–16)
AST SERPL-CCNC: 13 U/L (ref 15–37)
BACTERIA: ABNORMAL /HPF
BASOPHILS ABSOLUTE: 0 K/UL (ref 0–0.2)
BASOPHILS RELATIVE PERCENT: 0.4 %
BILIRUB SERPL-MCNC: 0.4 MG/DL (ref 0–1)
BILIRUBIN URINE: NEGATIVE
BLOOD, URINE: NEGATIVE
BUN BLDV-MCNC: 6 MG/DL (ref 7–20)
CALCIUM SERPL-MCNC: 9.1 MG/DL (ref 8.3–10.6)
CHLORIDE BLD-SCNC: 102 MMOL/L (ref 99–110)
CLARITY: ABNORMAL
CO2: 26 MMOL/L (ref 21–32)
COLOR: YELLOW
COMMENT UA: ABNORMAL
CREAT SERPL-MCNC: 0.6 MG/DL (ref 0.6–1.1)
EOSINOPHILS ABSOLUTE: 0.1 K/UL (ref 0–0.6)
EOSINOPHILS RELATIVE PERCENT: 1.3 %
EPITHELIAL CELLS, UA: 36 /HPF (ref 0–5)
GFR AFRICAN AMERICAN: >60
GFR NON-AFRICAN AMERICAN: >60
GLUCOSE BLD-MCNC: 96 MG/DL (ref 70–99)
GLUCOSE URINE: NEGATIVE MG/DL
HCG QUALITATIVE: NEGATIVE
HCT VFR BLD CALC: 39.4 % (ref 36–48)
HEMOGLOBIN: 13.1 G/DL (ref 12–16)
KETONES, URINE: NEGATIVE MG/DL
LEUKOCYTE ESTERASE, URINE: ABNORMAL
LIPASE: 44 U/L (ref 13–60)
LYMPHOCYTES ABSOLUTE: 3.4 K/UL (ref 1–5.1)
LYMPHOCYTES RELATIVE PERCENT: 47.1 %
MCH RBC QN AUTO: 28.8 PG (ref 26–34)
MCHC RBC AUTO-ENTMCNC: 33.3 G/DL (ref 31–36)
MCV RBC AUTO: 86.6 FL (ref 80–100)
MICROSCOPIC EXAMINATION: YES
MONOCYTES ABSOLUTE: 0.4 K/UL (ref 0–1.3)
MONOCYTES RELATIVE PERCENT: 5.5 %
MUCUS: ABNORMAL /LPF
NEUTROPHILS ABSOLUTE: 3.3 K/UL (ref 1.7–7.7)
NEUTROPHILS RELATIVE PERCENT: 45.7 %
NITRITE, URINE: NEGATIVE
PDW BLD-RTO: 13.3 % (ref 12.4–15.4)
PH UA: 6 (ref 5–8)
PLATELET # BLD: 212 K/UL (ref 135–450)
PMV BLD AUTO: 8.5 FL (ref 5–10.5)
POTASSIUM REFLEX MAGNESIUM: 3.8 MMOL/L (ref 3.5–5.1)
PROTEIN UA: NEGATIVE MG/DL
RBC # BLD: 4.56 M/UL (ref 4–5.2)
RBC UA: 1 /HPF (ref 0–4)
SODIUM BLD-SCNC: 138 MMOL/L (ref 136–145)
SPECIFIC GRAVITY UA: 1.02 (ref 1–1.03)
TOTAL PROTEIN: 6.7 G/DL (ref 6.4–8.2)
URINE TYPE: ABNORMAL
UROBILINOGEN, URINE: 0.2 E.U./DL
WBC # BLD: 7.2 K/UL (ref 4–11)
WBC UA: 50 /HPF (ref 0–5)

## 2021-12-02 PROCEDURE — 85025 COMPLETE CBC W/AUTO DIFF WBC: CPT

## 2021-12-02 PROCEDURE — 83690 ASSAY OF LIPASE: CPT

## 2021-12-02 PROCEDURE — 84703 CHORIONIC GONADOTROPIN ASSAY: CPT

## 2021-12-02 PROCEDURE — 99283 EMERGENCY DEPT VISIT LOW MDM: CPT

## 2021-12-02 PROCEDURE — 80053 COMPREHEN METABOLIC PANEL: CPT

## 2021-12-02 PROCEDURE — 6370000000 HC RX 637 (ALT 250 FOR IP): Performed by: PHYSICIAN ASSISTANT

## 2021-12-02 PROCEDURE — 81001 URINALYSIS AUTO W/SCOPE: CPT

## 2021-12-02 RX ORDER — CEPHALEXIN 500 MG/1
500 CAPSULE ORAL ONCE
Status: COMPLETED | OUTPATIENT
Start: 2021-12-02 | End: 2021-12-02

## 2021-12-02 RX ORDER — CEPHALEXIN 500 MG/1
500 CAPSULE ORAL 2 TIMES DAILY
Qty: 14 CAPSULE | Refills: 0 | Status: SHIPPED | OUTPATIENT
Start: 2021-12-02 | End: 2021-12-09

## 2021-12-02 RX ADMIN — CEPHALEXIN 500 MG: 500 CAPSULE ORAL at 19:30

## 2021-12-02 ASSESSMENT — PAIN DESCRIPTION - ORIENTATION: ORIENTATION: RIGHT

## 2021-12-02 ASSESSMENT — PAIN SCALES - GENERAL
PAINLEVEL_OUTOF10: 0
PAINLEVEL_OUTOF10: 0

## 2021-12-02 ASSESSMENT — PAIN DESCRIPTION - LOCATION: LOCATION: ABDOMEN

## 2021-12-02 NOTE — ED PROVIDER NOTES
[facility] Emergency Department    CHIEF COMPLAINT  Abdominal Pain (right-sided, states that she thought that she was constipated, but she has had some BMs, but is still having pain; had hep c tx in august and sublicaid injection on right side a couple weeks ago)      SHARED SERVICE VISIT  Evaluated by BORIS. My supervising physician was available for consultation. HISTORY OF PRESENT ILLNESS  Geovanny Hawley is a 39 y.o. female history of hepatitis (treatment completed), presents to ED for evaluation and of umbilical pain. Patient states this is been ongoing for the past 5 days and tends to wax and wane. Describes the pain as a cramping sensation that will usually resolve on its own. She states that she did have bowel movements which were regular and did not alter the pain.  is only prior abdominal surgery. Patient does get Sublicaid injections and the most recent one being on the right side a couple weeks ago however denies any redness or swelling around the area. No fevers or chills. Denies any urinary symptoms. No other complaints, modifying factors or associated symptoms. Nursing notes reviewed. Past Medical History:   Diagnosis Date    Drug abuse ([address]     Endocarditis     Hepatitis C     Lyme disease     MRSA (methicillin resistant staph aureus) culture positive 14     Past Surgical History:   Procedure Laterality Date     SECTION       History reviewed. No pertinent family history. Social History     Socioeconomic History    Marital status: Single     Spouse name: Not on file    Number of children: Not on file    Years of education: Not on file    Highest education level: Not on file   Occupational History    Not on file   Tobacco Use    Smoking status: Never Smoker    Smokeless tobacco: Never Used   Substance and Sexual Activity    Alcohol use: No    Drug use:  Yes    Sexual activity: Not on file   Other Topics Concern    Not on file   Social History Narrative    Not on file     Social Determinants of Health     Financial Resource Strain:     Difficulty of Paying Living Expenses: Not on file   Food Insecurity:     Worried About Running Out of Food in the Last Year: Not on file    Deysi of Food in the Last Year: Not on file   Transportation Needs:     Lack of Transportation (Medical): Not on file    Lack of Transportation (Non-Medical): Not on file   Physical Activity:     Days of Exercise per Week: Not on file    Minutes of Exercise per Session: Not on file   Stress:     Feeling of Stress : Not on file   Social Connections:     Frequency of Communication with Friends and Family: Not on file    Frequency of Social Gatherings with Friends and Family: Not on file    Attends Judaism Services: Not on file    Active Member of [address] [place] [address] [employer] or Organizations: Not on file    Attends Club or Organization Meetings: Not on file    Marital Status: Not on file   Intimate Partner Violence:     Fear of Current or Ex-Partner: Not on file    Emotionally Abused: Not on file    Physically Abused: Not on file    Sexually Abused: Not on file   Housing Stability:     Unable to Pay for Housing in the Last Year: Not on file    Number of [place] in the Last Year: Not on file    Unstable Housing in the Last Year: Not on file     No current facility-administered medications for this encounter.      Current Outpatient Medications   Medication Sig Dispense Refill    cephALEXin (KEFLEX) 500 MG capsule Take 1 capsule by mouth 2 times daily for 7 days 14 capsule 0    hydrOXYzine (VISTARIL) 100 MG capsule Take 100 mg by mouth every 6 hours as needed for Itching or Anxiety      ibuprofen (ADVIL;MOTRIN) 400 MG tablet Take 400 mg by mouth every 6 hours as needed for Pain      QUEtiapine (SEROQUEL) 100 MG tablet Take 100 mg by mouth nightly as needed (sleep / insomnia)      acetaminophen (APAP EXTRA STRENGTH) 500 MG tablet Take 2 tablets by mouth every 6 hours as needed for Pain DO NOT TAKE WITH OTHER MEDICATIONS CONTAINING ACETAMINOPHEN. 30 tablet 0     No Known Allergies    REVIEW OF SYSTEMS  10 systems reviewed, pertinent positives per HPI otherwise noted to be negative    PHYSICAL EXAM  /62   Pulse 57   Temp 97.8 °F (36.6 °C) (Temporal)   Resp 16   Ht 5' 6\" (1.676 m)   Wt 175 lb 11.3 oz (79.7 kg)   SpO2 100%   BMI 28.36 kg/m²   GENERAL APPEARANCE: Awake and alert. Cooperative. Estil Finney HEAD: Normocephalic. Atraumatic. EYES: EOM's grossly intact. ENT: Mucous membranes are moist.   NECK: Supple. HEART: RRR. No murmurs. LUNGS: Respirations unlabored. CTAB. Good air exchange. Speaking comfortably in full sentences. ABDOMEN: Soft. Non-distended. Mild tenderness periumbilical region. Negative Rovsing's. Negative Robles's sign. Negative McBurney's point. Bowel sounds positive; no guarding or rebound. No masses. No organomegaly. EXTREMITIES: No peripheral edema. Moves all extremities equally. All extremities neurovascularly intact. SKIN: Warm and dry. No acute rashes. NEUROLOGICAL: Alert and oriented. CN's 2-12 intact. No gross facial drooping. Strength 5/5, sensation intact. PSYCHIATRIC: Normal mood and affect.     RADIOLOGY  No orders to display       LABS  Labs Reviewed   COMPREHENSIVE METABOLIC PANEL W/ REFLEX TO MG FOR LOW K - Abnormal; Notable for the following components:       Result Value    BUN 6 (*)     ALT 8 (*)     AST 13 (*)     All other components within normal limits    Narrative:     Performed at:  [facility]  [address] [facility] [address]   Phone [phone]   URINALYSIS - Abnormal; Notable for the following components:    Clarity, UA CLOUDY (*)     Leukocyte Esterase, Urine TRACE (*)     All other components within normal limits    Narrative:     Performed at:  [facility]  [address] [facility] [address]   Phone [phone]   MICROSCOPIC URINALYSIS - Abnormal; Notable for the following components:    Mucus, UA 1+ (*)     Bacteria, UA 4+ (*)     WBC, UA 50 (*)     Epithelial Cells, UA 36 (*)     All other components within normal limits    Narrative:     Performed at:  [facility]  [address] [facility] [place] [address]   Phone [phone]   CBC WITH AUTO DIFFERENTIAL    Narrative:     Performed at:  [facility]  [address] [facility] [address]   Phone [phone]   LIPASE    Narrative:     Performed at:  [facility]  [address] [facility] [place] [address]   Phone [phone]   HCG, SERUM, QUALITATIVE    Narrative:     Performed at:  [facility]  [address] [facility] [place] [address]   Phone [phone]       PROCEDURES  Unless otherwise noted below, none  Procedures    ED COURSE      MDM  MDM  51-year-old female history of hepatitis C with treatment completed presents to ED for evaluation of periumbilical/right lower quadrant abdominal pain that is waxing and waning over the past 6 days. Patient denies any alleviating or aggravating factors. On arrival the pain is actually subsided and she is currently not having any pain. Vitals within normal limits and physical exam is rather benign. Abdomen is soft with only some mild reproducible periumbilical tenderness. Lab work demonstrated urinalysis remarkable for trace leuk leukocytes with 50 WBCs however there was symptomatic contamination with 36 epithelial cells. However given patient's symptoms of periumbilical/suprapubic discomfort we will plan to treat patient for urinary tract infection. Reassuring there is no leukocytosis lipase normal pregnancy negative no signs of any kidney injury or electrolyte abnormality. LFTs within normal limits.  Discussed with the patient the reassuring that the low likelihood of this being appendicitis given that the pain has been intermittent for the past week and then at this time patient is completely pain-free without complaint. Does the patient however if her pain would worsen or change or she develops a fever she should return the ED immediately for evaluation and advanced imaging. Patient was agreeable to this plan and was comfortable with discharge home to monitor her symptoms and treatment with urinary tract infection. DISPOSITION  Patient was discharged to home in good condition. CLINICAL IMPRESSION  1.  Acute cystitis without hematuria           Pedro Bentley PA-C  12/03/21 [id number]

## 2021-12-03 NOTE — ED NOTES
Discharge instructions and prescription discussed/given to pt, all questions answered. Pt left ambulatory, steady gait. No signs of acute distress noted. No further questions/concerns.       Uri Armas RN  12/02/21 [id number]

## 2022-01-11 ENCOUNTER — APPOINTMENT (OUTPATIENT)
Dept: CT IMAGING | Age: 37
End: 2022-01-11
Payer: COMMERCIAL

## 2022-01-11 ENCOUNTER — HOSPITAL ENCOUNTER (EMERGENCY)
Age: 37
Discharge: HOME | End: 2022-01-11
Attending: EMERGENCY MEDICINE
Payer: COMMERCIAL

## 2022-01-11 ENCOUNTER — APPOINTMENT (OUTPATIENT)
Dept: ULTRASOUND IMAGING | Age: 37
End: 2022-01-11
Payer: COMMERCIAL

## 2022-01-11 VITALS
HEIGHT: 66 IN | DIASTOLIC BLOOD PRESSURE: 60 MMHG | WEIGHT: 168.21 LBS | SYSTOLIC BLOOD PRESSURE: 108 MMHG | TEMPERATURE: 97.2 F | RESPIRATION RATE: 16 BRPM | OXYGEN SATURATION: 99 % | BODY MASS INDEX: 27.03 KG/M2 | HEART RATE: 61 BPM

## 2022-01-11 DIAGNOSIS — R10.31 ABDOMINAL PAIN, RIGHT LOWER QUADRANT: Primary | ICD-10-CM

## 2022-01-11 DIAGNOSIS — N76.0 BACTERIAL VAGINOSIS: ICD-10-CM

## 2022-01-11 DIAGNOSIS — R93.5 ABNORMAL CT OF THE ABDOMEN: ICD-10-CM

## 2022-01-11 DIAGNOSIS — N83.201 CYST OF RIGHT OVARY: ICD-10-CM

## 2022-01-11 DIAGNOSIS — N39.0 ACUTE UTI: ICD-10-CM

## 2022-01-11 DIAGNOSIS — B96.89 BACTERIAL VAGINOSIS: ICD-10-CM

## 2022-01-11 LAB
A/G RATIO: 1.5 (ref 1.1–2.2)
ALBUMIN SERPL-MCNC: 4.2 G/DL (ref 3.4–5)
ALP BLD-CCNC: 71 U/L (ref 40–129)
ALT SERPL-CCNC: 7 U/L (ref 10–40)
AMORPHOUS: ABNORMAL /HPF
ANION GAP SERPL CALCULATED.3IONS-SCNC: 10 MMOL/L (ref 3–16)
AST SERPL-CCNC: 13 U/L (ref 15–37)
BACTERIA WET PREP: ABNORMAL
BACTERIA: ABNORMAL /HPF
BASOPHILS ABSOLUTE: 0 K/UL (ref 0–0.2)
BASOPHILS RELATIVE PERCENT: 0.4 %
BILIRUB SERPL-MCNC: <0.2 MG/DL (ref 0–1)
BILIRUBIN URINE: NEGATIVE
BLOOD, URINE: NEGATIVE
BUN BLDV-MCNC: 8 MG/DL (ref 7–20)
CALCIUM SERPL-MCNC: 9 MG/DL (ref 8.3–10.6)
CHLORIDE BLD-SCNC: 101 MMOL/L (ref 99–110)
CLARITY: ABNORMAL
CLUE CELLS: ABNORMAL
CO2: 24 MMOL/L (ref 21–32)
COLOR: YELLOW
COMMENT UA: ABNORMAL
CREAT SERPL-MCNC: 0.7 MG/DL (ref 0.6–1.1)
EOSINOPHILS ABSOLUTE: 0.1 K/UL (ref 0–0.6)
EOSINOPHILS RELATIVE PERCENT: 1.6 %
EPITHELIAL CELLS WET PREP: ABNORMAL
EPITHELIAL CELLS, UA: >36 /HPF (ref 0–5)
GFR AFRICAN AMERICAN: >60
GFR NON-AFRICAN AMERICAN: >60
GLUCOSE BLD-MCNC: 96 MG/DL (ref 70–99)
GLUCOSE URINE: NEGATIVE MG/DL
HCG QUALITATIVE: NEGATIVE
HCT VFR BLD CALC: 42.7 % (ref 36–48)
HEMOGLOBIN: 14.3 G/DL (ref 12–16)
KETONES, URINE: NEGATIVE MG/DL
LEUKOCYTE ESTERASE, URINE: ABNORMAL
LYMPHOCYTES ABSOLUTE: 2.5 K/UL (ref 1–5.1)
LYMPHOCYTES RELATIVE PERCENT: 47.6 %
MCH RBC QN AUTO: 29 PG (ref 26–34)
MCHC RBC AUTO-ENTMCNC: 33.5 G/DL (ref 31–36)
MCV RBC AUTO: 86.5 FL (ref 80–100)
MICROSCOPIC EXAMINATION: YES
MONOCYTES ABSOLUTE: 0.6 K/UL (ref 0–1.3)
MONOCYTES RELATIVE PERCENT: 10.9 %
NEUTROPHILS ABSOLUTE: 2.1 K/UL (ref 1.7–7.7)
NEUTROPHILS RELATIVE PERCENT: 39.5 %
NITRITE, URINE: NEGATIVE
PDW BLD-RTO: 14.2 % (ref 12.4–15.4)
PH UA: 5.5 (ref 5–8)
PLATELET # BLD: 215 K/UL (ref 135–450)
PMV BLD AUTO: 8.4 FL (ref 5–10.5)
POTASSIUM REFLEX MAGNESIUM: 3.7 MMOL/L (ref 3.5–5.1)
PROTEIN UA: NEGATIVE MG/DL
RBC # BLD: 4.93 M/UL (ref 4–5.2)
RBC UA: ABNORMAL /HPF (ref 0–4)
RBC WET PREP: ABNORMAL
SODIUM BLD-SCNC: 135 MMOL/L (ref 136–145)
SOURCE WET PREP: ABNORMAL
SPECIFIC GRAVITY UA: 1.03 (ref 1–1.03)
TOTAL PROTEIN: 7 G/DL (ref 6.4–8.2)
TRICHOMONAS PREP: ABNORMAL
URINE REFLEX TO CULTURE: YES
URINE TYPE: ABNORMAL
UROBILINOGEN, URINE: 0.2 E.U./DL
WBC # BLD: 5.2 K/UL (ref 4–11)
WBC UA: 21 /HPF (ref 0–5)
WBC WET PREP: ABNORMAL
YEAST WET PREP: ABNORMAL

## 2022-01-11 PROCEDURE — 6360000004 HC RX CONTRAST MEDICATION: Performed by: NURSE PRACTITIONER

## 2022-01-11 PROCEDURE — 85025 COMPLETE CBC W/AUTO DIFF WBC: CPT

## 2022-01-11 PROCEDURE — 87591 N.GONORRHOEAE DNA AMP PROB: CPT

## 2022-01-11 PROCEDURE — 87491 CHLMYD TRACH DNA AMP PROBE: CPT

## 2022-01-11 PROCEDURE — 6360000002 HC RX W HCPCS: Performed by: NURSE PRACTITIONER

## 2022-01-11 PROCEDURE — 87086 URINE CULTURE/COLONY COUNT: CPT

## 2022-01-11 PROCEDURE — 84703 CHORIONIC GONADOTROPIN ASSAY: CPT

## 2022-01-11 PROCEDURE — 80053 COMPREHEN METABOLIC PANEL: CPT

## 2022-01-11 PROCEDURE — 99283 EMERGENCY DEPT VISIT LOW MDM: CPT

## 2022-01-11 PROCEDURE — 81001 URINALYSIS AUTO W/SCOPE: CPT

## 2022-01-11 PROCEDURE — 76830 TRANSVAGINAL US NON-OB: CPT

## 2022-01-11 PROCEDURE — 2580000003 HC RX 258: Performed by: NURSE PRACTITIONER

## 2022-01-11 PROCEDURE — 96365 THER/PROPH/DIAG IV INF INIT: CPT

## 2022-01-11 PROCEDURE — 87210 SMEAR WET MOUNT SALINE/INK: CPT

## 2022-01-11 PROCEDURE — 74177 CT ABD & PELVIS W/CONTRAST: CPT

## 2022-01-11 RX ORDER — CEPHALEXIN 500 MG/1
500 CAPSULE ORAL 2 TIMES DAILY
Qty: 14 CAPSULE | Refills: 0 | Status: SHIPPED | OUTPATIENT
Start: 2022-01-11 | End: 2022-01-18 | Stop reason: ALTCHOICE

## 2022-01-11 RX ORDER — 0.9 % SODIUM CHLORIDE 0.9 %
1000 INTRAVENOUS SOLUTION INTRAVENOUS ONCE
Status: COMPLETED | OUTPATIENT
Start: 2022-01-11 | End: 2022-01-11

## 2022-01-11 RX ORDER — METRONIDAZOLE 7.5 MG/G
GEL VAGINAL
Qty: 1 EACH | Refills: 0 | Status: SHIPPED | OUTPATIENT
Start: 2022-01-11 | End: 2022-01-18 | Stop reason: ALTCHOICE

## 2022-01-11 RX ADMIN — IOPAMIDOL 75 ML: 755 INJECTION, SOLUTION INTRAVENOUS at 11:35

## 2022-01-11 RX ADMIN — SODIUM CHLORIDE 1000 ML: 9 INJECTION, SOLUTION INTRAVENOUS at 13:34

## 2022-01-11 RX ADMIN — CEFTRIAXONE SODIUM 1000 MG: 1 INJECTION, POWDER, FOR SOLUTION INTRAMUSCULAR; INTRAVENOUS at 13:35

## 2022-01-11 ASSESSMENT — PAIN SCALES - GENERAL: PAINLEVEL_OUTOF10: 5

## 2022-01-11 ASSESSMENT — PAIN DESCRIPTION - PAIN TYPE: TYPE: ACUTE PAIN

## 2022-01-11 NOTE — ED TRIAGE NOTES
Patient to ED via private car c/o right lower quad pain intermittent x 1 month. Associated nausea. Denies emesis.

## 2022-01-11 NOTE — ED PROVIDER NOTES
Attending Note:    The patient was seen and examined by the mid-level provider. I also completed a face-to-face examination. HPI: Shaq Velasquez is a 39 y.o. female who presents to the emergency department with a complaint of severe sharp stabbing pain in the right lower quadrant that occurred on Sunday night, 2 days ago. She states that it lasted for approximately 20 to 30 minutes and then resolved. She states that it felt like a twisting pain and she got hot and sweaty when it occurred. She has had no further pain since that time. She had a similar episode 1 month ago that lasted for approximately 20 to 30 minutes. She came to the emergency department was diagnosed with cystitis with hematuria. She was prescribed antibiotics and the symptoms went away. She did have some urinary hesitancy and urgency at that time. She did have 1 other episode the day following the initial episode but has not had any further spells until 2 days ago. Currently she denies any fever, chills, nausea vomiting or diarrhea. She denies any dysuria hematuria frequency urgency. She denies any back pain or flank pain. She denies any vaginal bleeding or discharge. Medical history is significant for IVDU, hepatitis C. She was treated at [facility] and has seen the gastroenterology clinic there. She does have implantable birth control. No current or recent antibiotics. She is not currently sexually active. She denies any concern or suspicion for STD. No history of kidney stones. Physical Exam:     Constitutional: No apparent distress. Very pleasant. Appears comfortable. Head: No visible evidence of trauma. Normocephalic. Heart:  Regular rate and rhythm. Lungs: No conversational dyspnea or accessory muscle use. Abdomen:  Soft, non-distended. Nontender. No guarding rigidity or rebound. Currently abdomen is nontender. Unable to elicit any abdominal discomfort to palpation.   No palpable masses. No CVA tenderness. Musculoskeletal: Extremities non-tender with full range of motion. Neurological: Alert and oriented x 3. Speech clear. No acute focal motor or sensory deficits. Skin: Skin is warm and dry. No rash. Psychiatric: Normal mood and affect. Behavior is normal.     DIAGNOSTIC RESULTS     EKG: All EKG's are interpreted by the Emergency Department Physician who either signs or Co-signs this chart in the absence of a cardiologist.        RADIOLOGY:   Non-plain film images such as CT, Ultrasound and MRI are read by the radiologist. Plain radiographic images are visualized and preliminarily interpreted by the emergency physician with the below findings:        Interpretation per the Radiologist below, if available at the time of this note:    US NON OB TRANSVAGINAL   Final Result   10.1 cm simple appearing cyst originating from the right adnexa with bench   marked recommendations for follow-up below      No evidence for ovarian torsion      Normal appearing uterus and left ovary      RECOMMENDATIONS:   10.1 cm right ovarian cyst, concern for low-grade cystic neoplasm. Gynecology   consult recommended and consider MR with IV contrast if clinically warranted. Reference: Radiology 2019 Nov;293(2):359-371         CT ABDOMEN PELVIS W IV CONTRAST Additional Contrast? None   Final Result   1. 11.8 x 8.8 x 9.9 cm cystic mass in the anterior low pelvis, which is   suspected to be arising from the right adnexa. This raises concern for a   cystic neoplasm of the ovary. Given the patient's intermittent right lower   quadrant pain, ovarian torsion is a concern and a dedicated transvaginal   ultrasound is recommended. Also recommend a surgical consultation. 2. Trace fluid is seen in the deep pelvis and right lower quadrant. The   appendix is normal in caliber and is favored to not be the source of the   fluid. Clinical correlation with concern for acute appendicitis is   recommended.    3. Minimally complex cystic lesion seen in the inferior right renal pole with   small septations. No definitive solid component. This is a Bosniak 2F   lesion and follow-up CT or MRI is recommended in 6 months, 1 year and then   annually for 5 years. 4. Hepatic lesions, which attenuate above simple cystic density have not   changed in size since 2019. These could represent hemangiomas or focal   nodular hyperplasia, though neoplasm not completely excluded. Recommend   follow-up MRI. Findings were discussed with Shreya Justice at 12:26 pm on 1/11/2022.                ED BEDSIDE ULTRASOUND:   Performed by ED Physician - none    LABS:  Labs Reviewed   COMPREHENSIVE METABOLIC PANEL W/ REFLEX TO MG FOR LOW K - Abnormal; Notable for the following components:       Result Value    Sodium 135 (*)     ALT 7 (*)     AST 13 (*)     All other components within normal limits    Narrative:     Performed at:  [address] [employer]   Phone [phone]   URINE RT REFLEX TO CULTURE - Abnormal; Notable for the following components:    Clarity, UA TURBID (*)     Leukocyte Esterase, Urine TRACE (*)     All other components within normal limits    Narrative:     Performed at:  [address] [employer]   Phone [phone]   MICROSCOPIC URINALYSIS - Abnormal; Notable for the following components:    Bacteria, UA 4+ (*)     WBC, UA 21 (*)     Epithelial Cells, UA >36 (*)     All other components within normal limits    Narrative:     Performed at:  [address] [place]   Phone [phone]   CULTURE, URINE   C.TRACHOMATIS N.GONORRHOEAE DNA   WET PREP, GENITAL   CBC WITH AUTO DIFFERENTIAL    Narrative:     Performed at:  [address] [facility] [employer] [place]   Phone [phone]   HCG, SERUM, QUALITATIVE    Narrative:     Performed at:  [facility]  [address] [facility] [address] [place] [address]   Phone [phone]       All other labs were within normal range or not returned as of this dictation. EMERGENCY DEPARTMENT COURSE and DIFFERENTIAL DIAGNOSIS/MDM:   Vitals:    Vitals:    01/11/22 0910 01/11/22 1030 01/11/22 1045 01/11/22 1100   BP: 112/69 112/68 104/69 112/68   Pulse: 66   65   Resp: 17   16   Temp: 97.2 °F (36.2 °C)      TempSrc: Tympanic      SpO2: 96% 100% 100% 98%   Weight:       Height:           The patient presents with intermittent right lower quadrant pain for the last month. She was sent for CT abdomen and pelvis with IV contrast.  Radiologist reports enlarging mass in the right adnexa and requested pelvic ultrasound. She is hemodynamically stable. She is afebrile. Pregnancy test is negative. Cystic mass measures 11.8 x 8.8 x 9.9 cm. Trace free fluid noted in the pelvis. Appendix is normal.    There is evidence of urinary tract infection she was given Rocephin 1 g IV. Cultures pending. Low clinical suspicion for pelvic inflammatory disease. Wet prep and chlamydia were obtained. 1:18 PM: Pelvic ultrasound reveals no evidence of torsion. Simple appearing 10.1 cm cyst originating from the right adnexa. She will need additional work-up and evaluation by gastroenterology for the hepatic lesion seen on CT. Gynecology was consulted by the nurse practitioner caring for the patient in the ED.    1:51 PM: The nurse practitioner spoke with Dr. Zulma Michelle who is on-call for gynecology. Patient was advised to call today for an appointment to see him tomorrow in the office. Differential diagnosis was discussed with the patient and she was advised of the importance of follow-up. Etiology of the cyst is unclear but neoplastic process is certainly a possibility.   She was advised that she may need further evaluation including the possibility of surgical management. Verbalized understanding of the treatment plan and need for follow-up    She was also advised to follow-up with gastroenterology at [facility] regarding abnormal CT of the abdomen and pelvis/liver lesions. She was advised to call for an appointment tomorrow to be seen as soon as possible    MDM      CRITICAL CARE TIME   Total Critical Care time was 0 minutes, excluding separately reportable procedures. There was a high probability of clinically significant/life threatening deterioration in the patient's condition which required my urgent intervention. CONSULTS:  IP CONSULT TO OB GYN    PROCEDURES:  Unless otherwise noted below, none     Procedures        FINAL IMPRESSION      1. Abdominal pain, right lower quadrant    2. Abnormal CT of the abdomen    3. Cyst of right ovary          DISPOSITION/PLAN   DISPOSITION        PATIENT REFERRED TO:  Julio C Rodriges MD  [address] [facility] [address] [facility] [place] [address]  [phone]    Go in 1 day        DISCHARGE MEDICATIONS:  New Prescriptions    No medications on file     Controlled Substances Monitoring:     No flowsheet data found. (Please note that portions of this note were completed with a voice recognition program.  Efforts were made to edit the dictations but occasionally words are mis-transcribed. )    [id number] Yao Truong DO (electronically signed)  Attending Emergency Physician      Louis Pastor DO  01/11/22 3553

## 2022-01-11 NOTE — ED PROVIDER NOTES
[facility]        Pt Name: Uri Coats  MRN: [id number]  [place] 1985  Date of evaluation: 1/11/2022  Provider: SUZETTE Harris CNP  PCP: No primary care provider on file. Note Started: 9:39 AM EST        I have seen and evaluated this patient with my supervising physician Elizabeth Salinas, [facility]       Chief Complaint   Patient presents with    Abdominal Pain     rihgt lower abd pain onset. intermittent pain for one month. nausea when having pain. denies emesis       HISTORY OF PRESENT ILLNESS   (Location, Timing/Onset, Context/Setting, Quality, Duration, Modifying Factors, Severity, Associated Signs and Symptoms)  Note limiting factors. Chief Complaint: Abdominal pain    Uri Coats is a 39 y.o. female with medical history of drug abuse on suboxone, endocarditis, hepatitis C, Lyme disease who presents the emergency department with complaints of intermittent right lower quadrant abdominal pain for the past month. Patient notes 1 month ago she had an episode note was to her right lower quadrant right adnexa area. She states the episode lasted approximately 20 minutes and then have subsided. Last night she had another episode that lasted approximately 1 hour. She became nauseated from the pain and felt diaphoretic. The symptoms subsided on their own. She denies taking medication for the symptoms. Denies any aggravating or alleviating factors. She was assessed last month during the occurrence and was diagnosed with UTI and completed a course of treatment. She currently is denying any urinary symptoms. She thought it was possibly related to the UTI and therefore took a dose of Azo last night. She denies any associated nausea or vomiting, diarrhea, vaginal bleeding or discharge. Patient has irregular menses secondary to an IUD.   She denies any concern for STD as she has not been sexually active in the past few months and definitely not in the past month. She denies any smoking or alcohol use, former drug abuse and she takes injectable Suboxone. Currently is pain free and denies any symptoms at this time. Nursing Notes were all reviewed and agreed with or any disagreements were addressed in the HPI. REVIEW OF SYSTEMS    (2-9 systems for level 4, 10 or more for level 5)     Review of Systems    Positives and Pertinent negatives as per HPI. Except as noted above in the ROS, all other systems were reviewed and negative. PAST MEDICAL HISTORY     Past Medical History:   Diagnosis Date    Drug abuse ([facility] [address]     Endocarditis     Hepatitis C     Lyme disease     MRSA (methicillin resistant staph aureus) culture positive 14         SURGICAL HISTORY     Past Surgical History:   Procedure Laterality Date     SECTION           [address]       Discharge Medication List as of 2022  2:43 PM      CONTINUE these medications which have NOT CHANGED    Details   hydrOXYzine (VISTARIL) 100 MG capsule Take 100 mg by mouth every 6 hours as needed for Itching or AnxietyHistorical Med      ibuprofen (ADVIL;MOTRIN) 400 MG tablet Take 400 mg by mouth every 6 hours as needed for PainHistorical Med      QUEtiapine (SEROQUEL) 100 MG tablet Take 100 mg by mouth nightly as needed (sleep / insomnia)Historical Med      acetaminophen (APAP EXTRA STRENGTH) 500 MG tablet Take 2 tablets by mouth every 6 hours as needed for Pain DO NOT TAKE WITH OTHER MEDICATIONS CONTAINING ACETAMINOPHEN., Disp-30 tablet, R-0Print               ALLERGIES     Patient has no known allergies. FAMILYHISTORY     History reviewed. No pertinent family history.        SOCIAL HISTORY       Social History     Tobacco Use    Smoking status: Never Smoker    Smokeless tobacco: Never Used   Substance Use Topics    Alcohol use: No    Drug use: Yes       SCREENINGS             PHYSICAL EXAM    (up to 7 for level 4, 8 or more for level 5)     ED Triage Vitals   BP Temp Temp Source Pulse Resp SpO2 Height Weight   01/11/22 0910 01/11/22 0910 01/11/22 0910 01/11/22 0910 01/11/22 0910 01/11/22 0910 01/11/22 0900 01/11/22 0900   112/69 97.2 °F (36.2 °C) Tympanic 66 17 96 % 5' 6\" (1.676 m) 168 lb 3.4 oz (76.3 kg)       Physical Exam  Vitals and nursing note reviewed. Constitutional:       General: She is awake. Appearance: Normal appearance. She is well-developed and overweight. HENT:      Head: Normocephalic and atraumatic. Nose: Nose normal.   Eyes:      General:         Right eye: No discharge. Left eye: No discharge. Cardiovascular:      Rate and Rhythm: Normal rate and regular rhythm. Heart sounds: Normal heart sounds. Pulmonary:      Effort: Pulmonary effort is normal. No respiratory distress. Breath sounds: Normal breath sounds. Abdominal:      General: Bowel sounds are normal.      Palpations: Abdomen is soft. Tenderness: There is no abdominal tenderness. Musculoskeletal:         General: Normal range of motion. Cervical back: Normal range of motion. Skin:     General: Skin is warm and dry. Coloration: Skin is not pale. Neurological:      Mental Status: She is alert and oriented to person, place, and time. Psychiatric:         Behavior: Behavior normal. Behavior is cooperative.          DIAGNOSTIC RESULTS   LABS:    Labs Reviewed   WET PREP, GENITAL - Abnormal; Notable for the following components:       Result Value    Clue Cells, Wet Prep 1+ (*)     All other components within normal limits    Narrative:     Performed at:  [address] [address]   Phone [phone]   COMPREHENSIVE METABOLIC PANEL W/ REFLEX TO MG FOR LOW K - Abnormal; Notable for the following components:    Sodium 135 (*)     ALT 7 (*)     AST 13 (*)     All other components within normal limits    Narrative:     Performed at:  [address] [address]   Phone [phone]   URINE RT REFLEX TO CULTURE - Abnormal; Notable for the following components:    Clarity, UA TURBID (*)     Leukocyte Esterase, Urine TRACE (*)     All other components within normal limits    Narrative:     Performed at:  [address] [address]   Phone [phone]   MICROSCOPIC URINALYSIS - Abnormal; Notable for the following components:    Bacteria, UA 4+ (*)     WBC, UA 21 (*)     Epithelial Cells, UA >36 (*)     All other components within normal limits    Narrative:     Performed at:  [address] [address]   Phone [phone]   CULTURE, URINE   C.TRACHOMATIS N.GONORRHOEAE DNA   CBC WITH AUTO DIFFERENTIAL    Narrative:     Performed at:  [address] [address]   Phone [phone]   HCG, SERUM, QUALITATIVE    Narrative:     Performed at:  [address] [address]   Phone [phone]       When ordered only abnormal lab results are displayed. All other labs were within normal range or not returned as of this dictation. EKG: When ordered, EKG's are interpreted by the Emergency Department Physician in the absence of a cardiologist.  Please see their note for interpretation of EKG.     RADIOLOGY:   Non-plain film images such as CT, Ultrasound and MRI are read by the radiologist. Plain radiographic images are visualized and preliminarily interpreted by the ED Provider with the below findings:        Interpretation per the Radiologist below, if available at the time of this note:    US NON OB TRANSVAGINAL   Final Result   10.1 cm simple appearing cyst originating from the right adnexa with bench   marked recommendations for follow-up below      No evidence for ovarian torsion      Normal appearing uterus and left ovary      RECOMMENDATIONS:   10.1 cm right ovarian cyst, concern for low-grade cystic neoplasm. Gynecology   consult recommended and consider MR with IV contrast if clinically warranted. Reference: Radiology 2019 Nov;293(3):359-371         CT ABDOMEN PELVIS W IV CONTRAST Additional Contrast? None   Final Result   1. 11.8 x 8.8 x 9.9 cm cystic mass in the anterior low pelvis, which is   suspected to be arising from the right adnexa. This raises concern for a   cystic neoplasm of the ovary. Given the patient's intermittent right lower   quadrant pain, ovarian torsion is a concern and a dedicated transvaginal   ultrasound is recommended. Also recommend a surgical consultation. 2. Trace fluid is seen in the deep pelvis and right lower quadrant. The   appendix is normal in caliber and is favored to not be the source of the   fluid. Clinical correlation with concern for acute appendicitis is   recommended. 3. Minimally complex cystic lesion seen in the inferior right renal pole with   small septations. No definitive solid component. This is a Bosniak 2F   lesion and follow-up CT or MRI is recommended in 6 months, 1 year and then   annually for 5 years. 4. Hepatic lesions, which attenuate above simple cystic density have not   changed in size since 2019. These could represent hemangiomas or focal   nodular hyperplasia, though neoplasm not completely excluded. Recommend   follow-up MRI. Findings were discussed with Shalini Hernandez at 12:26 pm on 1/11/2022. No results found.         PROCEDURES   Unless otherwise noted below, none     Procedures    CRITICAL CARE TIME   N/A    CONSULTS:  IP CONSULT TO OB GYN      EMERGENCY DEPARTMENT COURSE and DIFFERENTIAL DIAGNOSIS/MDM:   Vitals:    Vitals:    01/11/22 1045 01/11/22 1100 01/11/22 1145 01/11/22 1445   BP: 104/69 112/68 111/66 108/60   Pulse:  65  61   Resp:  16 16   Temp:       TempSrc:       SpO2: 100% 98% 93% 99%   Weight:       Height:           Patient was given the following medications:  Medications   iopamidol (ISOVUE-370) 76 % injection 75 mL (75 mLs IntraVENous Given 1/11/22 1135)   0.9 % sodium chloride bolus (0 mLs IntraVENous Stopped 1/11/22 1434)   cefTRIAXone (ROCEPHIN) 1000 mg IVPB in 50 mL D5W minibag (0 mg IntraVENous Stopped 1/11/22 1405)           Care of this patient took place during the COVID-19 pandemic emergency. ED COURSE & MEDICAL DECISION MAKING    - The patient presented to the ER with complaints of RLQ abd pain. Vital signs were reviewed. Exam well-developed, well-nourished female who appears uncomfortable. Peripheral IV placed. Labs, Imaging ordered. - Pertinent Labs & Imaging studies reviewed. (See chart for details)   -  Patient seen and evaluated in the emergency department. -  Triage and nursing notes reviewed and incorporated. -  Old chart records reviewed and incorporated.  -  Dr. Jalyn Henson emergency department attending assessed the patient. -  Differential diagnosis includes: pelvic inflammatory disease, TOA, ovarian torsion, kidney stone, pyelonephritis, UTI, BV/vaginitis, cervicitis, ovarian cysts, round ligament pain, pregnancy (including ectopic), appendicitis, bowel obstruction, diverticulitis, hernia, gastroenteritis, colitis vs COVID-19  -  Work-up included:  See above  -  ED treatment included:   Rocephin, normal saline  -  Results discussed with patient. Angely Watson is a 63-[id number] female with complaints of intermittent right lower quadrant pain x1 month. She had 1 episode 1 month ago that lasted approximately 20 minutes and subsided. She had another episode last night the last approximately 1 hour. She had associated chills and nausea. She was assessed last month and was diagnosed with cystitis with hematuria. She completed her course of antibiotics.   She currently is pain-free and has no associated symptoms. On exam lungs are clear to auscultation abdomen is soft and nontender. She ambulated to the restroom without difficulty. Lab work and imaging is obtained. CBC is unremarkable. UA with turbid clarity, trace leukocytes, 4+ bacteria, 21 WBC and greater than 36 epithelial cells. This appears consistent with patient's prior result with contamination. hCG negative. CT abd pelvis were called to me by radiologist Dr. Vlad Li with abnormal findings. 1.  11.8 x 8.8 x 9.9 cm cystic mass in the anterior lower pelvis which is suspect to be arising from the right adnexa. This raises concern for a cystic neoplasm of the ovary. Given the patient's intermittent right lower quadrant pain, ovarian torsion is a concern and a dedicated transvaginal ultrasound is recommended. Also recommended a surgical consultation. 2. trace fluid is seen in the deep pelvis and right lower quadrant. The appendix is normal in caliber is favored to be not the source of fluid. Clinical correlation with concern for acute appendicitis is recommended. Minimally complex cystic lesion seen in the inferior right renal pole with small septations. No definitive solid compound. This is a Bosniak 2F lesion follow-up CT or MRI is recommended in 6 months, 1 year, and then annually for 5 years. 3. Hepatic lesions, which attenuated above simple cystic density have not changed in size since 2019. This could represent hemangiomas or focal nodular hyperplasia, all the neoplasm not completely excluded. Recommend follow-up MRI. Phone consult with Dr. Seymour Rea who said patient can be seen in the office tomorrow for follow-up assessment. Patient was informed on the abnormal liver findings on the CT scan and states she is followed by GI at [facility] for her hepatitis treatment. States she has completed the hepatitis treatment and did have an ultrasound of the liver recently and was told it was unremarkable.   She denies any follow-up for an MRI of the liver.  I instructed her to follow-up with PCP and GI at [facility] for further assessment of the liver. She was given strict return discharge instructions. Shared decision making is complete and patient is stable for discharge at this time. CRITICAL CARE TIME   Total Critical Care time was 25 minutes, excluding separately reportable procedures. There was a high probability of clinically significant/life threatening deterioration in the patient's condition which required my urgent intervention. FINAL IMPRESSION      1. Abdominal pain, right lower quadrant    2. Abnormal CT of the abdomen    3. Cyst of right ovary    4. Acute UTI    5.  Bacterial vaginosis          DISPOSITION/PLAN   DISPOSITION        PATIENT REFERRED TO:  Brii Hale MD  [address] [facility]  [address] [facility] [address] [place] [address]    Go in 1 day      [facility]  [phone]  Call in 2 days  As needed, If symptoms worsen    [facility]  [address] [facility]  [phone]  Go to   As needed      DISCHARGE MEDICATIONS:  Discharge Medication List as of 1/11/2022  2:43 PM      START taking these medications    Details   cephALEXin (KEFLEX) 500 MG capsule Take 1 capsule by mouth 2 times daily for 7 days, Disp-14 capsule, R-0Print      metroNIDAZOLE (METROGEL VAGINAL) 0.75 % vaginal gel apply 1 applicatorful into vagina nightly for 5 nights, Disp-1 each, R-0, Print             DISCONTINUED MEDICATIONS:  Discharge Medication List as of 1/11/2022  2:43 PM                 (Please note that portions of this note were completed with a voice recognition program.  Efforts were made to edit the dictations but occasionally words are mis-transcribed.)    SUZETTE Celaya CNP (electronically signed)            SUZETTE Celaya CNP  01/11/22 4986

## 2022-01-12 ENCOUNTER — OFFICE VISIT (OUTPATIENT)
Dept: GYNECOLOGY | Age: 37
End: 2022-01-12
Payer: COMMERCIAL

## 2022-01-12 VITALS
DIASTOLIC BLOOD PRESSURE: 84 MMHG | BODY MASS INDEX: 27.79 KG/M2 | HEART RATE: 105 BPM | WEIGHT: 172.2 LBS | SYSTOLIC BLOOD PRESSURE: 121 MMHG | TEMPERATURE: 97.7 F

## 2022-01-12 DIAGNOSIS — Z01.419 WELL WOMAN EXAM WITH ROUTINE GYNECOLOGICAL EXAM: Primary | ICD-10-CM

## 2022-01-12 DIAGNOSIS — N83.201 RIGHT OVARIAN CYST: ICD-10-CM

## 2022-01-12 LAB
C TRACH DNA GENITAL QL NAA+PROBE: NEGATIVE
N. GONORRHOEAE DNA: NEGATIVE
URINE CULTURE, ROUTINE: NORMAL

## 2022-01-12 PROCEDURE — G8484 FLU IMMUNIZE NO ADMIN: HCPCS | Performed by: OBSTETRICS & GYNECOLOGY

## 2022-01-12 PROCEDURE — 99385 PREV VISIT NEW AGE 18-39: CPT | Performed by: OBSTETRICS & GYNECOLOGY

## 2022-01-12 NOTE — LETTER
[facility]  [address] [facility]. [facility] [address]  Phone: [phone]  Fax: [phone]    Raleigh Huffman MD        January 12, 2022     Patient: Марина Owens   YOB: 1985   Date of Visit: 1/12/2022       To Whom it May Concern:    Nikki Downing was seen in my clinic on 1/12/2022. She may return to work on 01/12/2022. If you have any questions or concerns, please don't hesitate to call.     Sincerely,         Max KWONG MD

## 2022-01-12 NOTE — PROGRESS NOTES
right ov cyst     Plan:   Pre op appt next Mon with surgery Thurs. BRCA testing for fam h/o breast cancer. Call with results.        Debo Chavira MD

## 2022-01-16 ENCOUNTER — HOSPITAL ENCOUNTER (EMERGENCY)
Age: 37
Discharge: LEFT BEFORE DISCHARGE | End: 2022-01-16

## 2022-01-17 ENCOUNTER — TELEPHONE (OUTPATIENT)
Dept: GYNECOLOGY | Age: 37
End: 2022-01-17

## 2022-01-17 ENCOUNTER — APPOINTMENT (OUTPATIENT)
Dept: ULTRASOUND IMAGING | Age: 37
End: 2022-01-17
Payer: COMMERCIAL

## 2022-01-17 ENCOUNTER — OFFICE VISIT (OUTPATIENT)
Dept: GYNECOLOGY | Age: 37
End: 2022-01-17
Payer: COMMERCIAL

## 2022-01-17 ENCOUNTER — HOSPITAL ENCOUNTER (EMERGENCY)
Age: 37
Discharge: HOME | End: 2022-01-17
Attending: EMERGENCY MEDICINE
Payer: COMMERCIAL

## 2022-01-17 VITALS
DIASTOLIC BLOOD PRESSURE: 85 MMHG | WEIGHT: 170.4 LBS | BODY MASS INDEX: 27.5 KG/M2 | SYSTOLIC BLOOD PRESSURE: 121 MMHG | HEART RATE: 79 BPM | TEMPERATURE: 97.7 F

## 2022-01-17 VITALS
DIASTOLIC BLOOD PRESSURE: 74 MMHG | HEART RATE: 60 BPM | OXYGEN SATURATION: 100 % | RESPIRATION RATE: 15 BRPM | TEMPERATURE: 97.9 F | SYSTOLIC BLOOD PRESSURE: 121 MMHG

## 2022-01-17 DIAGNOSIS — N83.201 RIGHT OVARIAN CYST: Primary | ICD-10-CM

## 2022-01-17 LAB
A/G RATIO: 1.5 (ref 1.1–2.2)
ALBUMIN SERPL-MCNC: 4.1 G/DL (ref 3.4–5)
ALP BLD-CCNC: 62 U/L (ref 40–129)
ALT SERPL-CCNC: 7 U/L (ref 10–40)
ANION GAP SERPL CALCULATED.3IONS-SCNC: 13 MMOL/L (ref 3–16)
AST SERPL-CCNC: 12 U/L (ref 15–37)
BASOPHILS ABSOLUTE: 0 K/UL (ref 0–0.2)
BASOPHILS RELATIVE PERCENT: 0.5 %
BILIRUB SERPL-MCNC: 0.3 MG/DL (ref 0–1)
BILIRUBIN URINE: NEGATIVE
BLOOD, URINE: NEGATIVE
BUN BLDV-MCNC: 6 MG/DL (ref 7–20)
CALCIUM SERPL-MCNC: 8.6 MG/DL (ref 8.3–10.6)
CHLORIDE BLD-SCNC: 103 MMOL/L (ref 99–110)
CLARITY: CLEAR
CO2: 21 MMOL/L (ref 21–32)
COLOR: YELLOW
CREAT SERPL-MCNC: 0.6 MG/DL (ref 0.6–1.1)
EOSINOPHILS ABSOLUTE: 0.1 K/UL (ref 0–0.6)
EOSINOPHILS RELATIVE PERCENT: 0.7 %
GFR AFRICAN AMERICAN: >60
GFR NON-AFRICAN AMERICAN: >60
GLUCOSE BLD-MCNC: 103 MG/DL (ref 70–99)
GLUCOSE URINE: NEGATIVE MG/DL
HCG QUALITATIVE: NEGATIVE
HCT VFR BLD CALC: 39.8 % (ref 36–48)
HEMOGLOBIN: 13.8 G/DL (ref 12–16)
KETONES, URINE: NEGATIVE MG/DL
LEUKOCYTE ESTERASE, URINE: NEGATIVE
LYMPHOCYTES ABSOLUTE: 2.6 K/UL (ref 1–5.1)
LYMPHOCYTES RELATIVE PERCENT: 24.4 %
MCH RBC QN AUTO: 29.8 PG (ref 26–34)
MCHC RBC AUTO-ENTMCNC: 34.6 G/DL (ref 31–36)
MCV RBC AUTO: 86.1 FL (ref 80–100)
MICROSCOPIC EXAMINATION: NORMAL
MONOCYTES ABSOLUTE: 0.5 K/UL (ref 0–1.3)
MONOCYTES RELATIVE PERCENT: 5 %
NEUTROPHILS ABSOLUTE: 7.5 K/UL (ref 1.7–7.7)
NEUTROPHILS RELATIVE PERCENT: 69.4 %
NITRITE, URINE: NEGATIVE
PDW BLD-RTO: 13.8 % (ref 12.4–15.4)
PH UA: 6 (ref 5–8)
PLATELET # BLD: 239 K/UL (ref 135–450)
PMV BLD AUTO: 8.5 FL (ref 5–10.5)
POTASSIUM SERPL-SCNC: 4.2 MMOL/L (ref 3.5–5.1)
PROTEIN UA: NEGATIVE MG/DL
RBC # BLD: 4.62 M/UL (ref 4–5.2)
SODIUM BLD-SCNC: 137 MMOL/L (ref 136–145)
SPECIFIC GRAVITY UA: 1.01 (ref 1–1.03)
TOTAL PROTEIN: 6.8 G/DL (ref 6.4–8.2)
URINE REFLEX TO CULTURE: NORMAL
URINE TYPE: NORMAL
UROBILINOGEN, URINE: 0.2 E.U./DL
WBC # BLD: 10.8 K/UL (ref 4–11)

## 2022-01-17 PROCEDURE — 80053 COMPREHEN METABOLIC PANEL: CPT

## 2022-01-17 PROCEDURE — 96375 TX/PRO/DX INJ NEW DRUG ADDON: CPT

## 2022-01-17 PROCEDURE — 99285 EMERGENCY DEPT VISIT HI MDM: CPT

## 2022-01-17 PROCEDURE — G8484 FLU IMMUNIZE NO ADMIN: HCPCS | Performed by: OBSTETRICS & GYNECOLOGY

## 2022-01-17 PROCEDURE — 76856 US EXAM PELVIC COMPLETE: CPT

## 2022-01-17 PROCEDURE — 2580000003 HC RX 258: Performed by: PHYSICIAN ASSISTANT

## 2022-01-17 PROCEDURE — 96374 THER/PROPH/DIAG INJ IV PUSH: CPT

## 2022-01-17 PROCEDURE — G8427 DOCREV CUR MEDS BY ELIG CLIN: HCPCS | Performed by: OBSTETRICS & GYNECOLOGY

## 2022-01-17 PROCEDURE — 6360000002 HC RX W HCPCS: Performed by: PHYSICIAN ASSISTANT

## 2022-01-17 PROCEDURE — 99214 OFFICE O/P EST MOD 30 MIN: CPT | Performed by: OBSTETRICS & GYNECOLOGY

## 2022-01-17 PROCEDURE — 81003 URINALYSIS AUTO W/O SCOPE: CPT

## 2022-01-17 PROCEDURE — 6370000000 HC RX 637 (ALT 250 FOR IP): Performed by: EMERGENCY MEDICINE

## 2022-01-17 PROCEDURE — 84703 CHORIONIC GONADOTROPIN ASSAY: CPT

## 2022-01-17 PROCEDURE — 85025 COMPLETE CBC W/AUTO DIFF WBC: CPT

## 2022-01-17 PROCEDURE — G8419 CALC BMI OUT NRM PARAM NOF/U: HCPCS | Performed by: OBSTETRICS & GYNECOLOGY

## 2022-01-17 PROCEDURE — 4004F PT TOBACCO SCREEN RCVD TLK: CPT | Performed by: OBSTETRICS & GYNECOLOGY

## 2022-01-17 PROCEDURE — 36415 COLL VENOUS BLD VENIPUNCTURE: CPT

## 2022-01-17 RX ORDER — SODIUM CHLORIDE 0.9 % (FLUSH) 0.9 %
5-40 SYRINGE (ML) INJECTION PRN
Status: CANCELLED | OUTPATIENT
Start: 2022-01-17

## 2022-01-17 RX ORDER — HYDROCODONE BITARTRATE AND ACETAMINOPHEN 5; 325 MG/1; MG/1
1 TABLET ORAL EVERY 8 HOURS PRN
Qty: 10 TABLET | Refills: 0 | Status: SHIPPED | OUTPATIENT
Start: 2022-01-17 | End: 2022-01-18

## 2022-01-17 RX ORDER — SODIUM CHLORIDE, SODIUM LACTATE, POTASSIUM CHLORIDE, CALCIUM CHLORIDE 600; 310; 30; 20 MG/100ML; MG/100ML; MG/100ML; MG/100ML
INJECTION, SOLUTION INTRAVENOUS CONTINUOUS
Status: CANCELLED | OUTPATIENT
Start: 2022-01-17

## 2022-01-17 RX ORDER — ONDANSETRON 2 MG/ML
4 INJECTION INTRAMUSCULAR; INTRAVENOUS ONCE
Status: COMPLETED | OUTPATIENT
Start: 2022-01-17 | End: 2022-01-17

## 2022-01-17 RX ORDER — SODIUM CHLORIDE 0.9 % (FLUSH) 0.9 %
5-40 SYRINGE (ML) INJECTION EVERY 12 HOURS SCHEDULED
Status: CANCELLED | OUTPATIENT
Start: 2022-01-17

## 2022-01-17 RX ORDER — HYDROCODONE BITARTRATE AND ACETAMINOPHEN 5; 325 MG/1; MG/1
1 TABLET ORAL EVERY 4 HOURS PRN
Qty: 18 TABLET | Refills: 0 | Status: ON HOLD | OUTPATIENT
Start: 2022-01-17 | End: 2022-01-20 | Stop reason: HOSPADM

## 2022-01-17 RX ORDER — SODIUM CHLORIDE 9 MG/ML
25 INJECTION, SOLUTION INTRAVENOUS PRN
Status: CANCELLED | OUTPATIENT
Start: 2022-01-17

## 2022-01-17 RX ORDER — HYDROCODONE BITARTRATE AND ACETAMINOPHEN 5; 325 MG/1; MG/1
1 TABLET ORAL ONCE
Status: COMPLETED | OUTPATIENT
Start: 2022-01-17 | End: 2022-01-17

## 2022-01-17 RX ORDER — 0.9 % SODIUM CHLORIDE 0.9 %
1000 INTRAVENOUS SOLUTION INTRAVENOUS ONCE
Status: COMPLETED | OUTPATIENT
Start: 2022-01-17 | End: 2022-01-17

## 2022-01-17 RX ORDER — KETOROLAC TROMETHAMINE 15 MG/ML
15 INJECTION, SOLUTION INTRAMUSCULAR; INTRAVENOUS ONCE
Status: COMPLETED | OUTPATIENT
Start: 2022-01-17 | End: 2022-01-17

## 2022-01-17 RX ADMIN — HYDROCODONE BITARTRATE AND ACETAMINOPHEN 1 TABLET: 5; 325 TABLET ORAL at 06:00

## 2022-01-17 RX ADMIN — KETOROLAC TROMETHAMINE 15 MG: 15 INJECTION, SOLUTION INTRAMUSCULAR; INTRAVENOUS at 04:05

## 2022-01-17 RX ADMIN — SODIUM CHLORIDE 1000 ML: 9 INJECTION, SOLUTION INTRAVENOUS at 04:05

## 2022-01-17 RX ADMIN — ONDANSETRON 4 MG: 2 INJECTION INTRAMUSCULAR; INTRAVENOUS at 04:05

## 2022-01-17 ASSESSMENT — PAIN DESCRIPTION - PAIN TYPE: TYPE: ACUTE PAIN

## 2022-01-17 ASSESSMENT — PAIN SCALES - GENERAL
PAINLEVEL_OUTOF10: 7
PAINLEVEL_OUTOF10: 4
PAINLEVEL_OUTOF10: 4
PAINLEVEL_OUTOF10: 7

## 2022-01-17 ASSESSMENT — PAIN DESCRIPTION - LOCATION: LOCATION: ABDOMEN

## 2022-01-17 ASSESSMENT — PAIN DESCRIPTION - FREQUENCY: FREQUENCY: INTERMITTENT

## 2022-01-17 ASSESSMENT — PAIN DESCRIPTION - ONSET: ONSET: SUDDEN

## 2022-01-17 ASSESSMENT — PAIN DESCRIPTION - DESCRIPTORS: DESCRIPTORS: PRESSURE

## 2022-01-17 ASSESSMENT — ENCOUNTER SYMPTOMS
VOMITING: 0
COLOR CHANGE: 0
NAUSEA: 1
ABDOMINAL PAIN: 1

## 2022-01-17 ASSESSMENT — PAIN DESCRIPTION - ORIENTATION: ORIENTATION: RIGHT;LOWER

## 2022-01-17 NOTE — ED NOTES
Discharge and education instructions reviewed. Patient verbalized understanding, teach-back successful. Patient denied questions at this time. No acute distress noted. Patient instructed to follow-up as noted - return to emergency department if symptoms worsen. Patient verbalized understanding. Discharged per EDMD with discharge instructions.         [address] [place] [address], [address] [place] [address]  01/17/22 8737

## 2022-01-17 NOTE — H&P
HISTORY AND PHYSICAL             Date: 2022        Patient Name: Adry Sanches     YOB: 1985      Age:  39 y.o. Chief Complaint     Chief Complaint   Patient presents with    Pre-op Exam     c/o continued pain and cramping      10 cm ov cyst    History Obtained From   patient    History of Present Illness    presents with 10 cm right ov simple cyst.  Several ER visits for pain. Past Medical History     Past Medical History:   Diagnosis Date    Drug abuse ([address]     Endocarditis     Hepatitis C     Lyme disease     MRSA (methicillin resistant staph aureus) culture positive 14        Past Surgical History     Past Surgical History:   Procedure Laterality Date     SECTION      Ob History  1 c/s  1 EAB    Medications Prior to Admission     Prior to Admission medications    Medication Sig Start Date End Date Taking? Authorizing Provider   HYDROcodone-acetaminophen (NORCO) 5-325 MG per tablet Take 1 tablet by mouth every 4 hours as needed for Pain for up to 3 days. Intended supply: 3 days.  Take lowest dose possible to manage pain 22  Karlos Wolfe DO   cephALEXin [facility]) 500 MG capsule Take 1 capsule by mouth 2 times daily for 7 days 22  SUZETTE Pina CNP   metroNIDAZOLE (METROGEL VAGINAL) 0.75 % vaginal gel apply 1 applicatorful into vagina nightly for 5 nights 22  SUZETTE Pina CNP   hydrOXYzine (VISTARIL) 100 MG capsule Take 100 mg by mouth every 6 hours as needed for Itching or Anxiety    Historical Provider, MD   ibuprofen (ADVIL;MOTRIN) 400 MG tablet Take 400 mg by mouth every 6 hours as needed for Pain    Historical Provider, MD   QUEtiapine (SEROQUEL) 100 MG tablet Take 100 mg by mouth nightly as needed (sleep / insomnia)    Historical Provider, MD   acetaminophen (APAP EXTRA STRENGTH) 500 MG tablet Take 2 tablets by mouth every 6 hours as needed for Pain DO NOT TAKE WITH OTHER MEDICATIONS CONTAINING ACETAMINOPHEN. 1/30/19   ChristianJOAO Owens    monthly injectable Suboxone    Allergies   Patient has no known allergies. Social History     Social History     Tobacco History     Smoking Status  Current Every Day Smoker Smoking Frequency  1 pack/day Smoking Tobacco Type  Cigarettes    Smokeless Tobacco Use  Never Used          Alcohol History     Alcohol Use Status  No          Drug Use     Drug Use Status  Not Currently Comment  clean for 14 months          Sexual Activity     Sexually Active  Yes Partners  Male                Family History   No family history on file. Review of Systems   Review of Systems  Pertinent review of systems items discussed above. All others systems items not discussed above were negative.     Physical Exam   /85   Pulse 79   Temp 97.7 °F (36.5 °C)   Wt 170 lb 6.4 oz (77.3 kg)   BMI 27.50 kg/m²     Physical Exam    CV- RRR  resp- lizbeth CTA    Labs      Recent Results (from the past 24 hour(s))   CBC Auto Differential    Collection Time: 01/17/22  3:58 AM   Result Value Ref Range    WBC 10.8 4.0 - 11.0 K/uL    RBC 4.62 4.00 - 5.20 M/uL    Hemoglobin 13.8 12.0 - 16.0 g/dL    Hematocrit 39.8 36.0 - 48.0 %    MCV 86.1 80.0 - 100.0 fL    MCH 29.8 26.0 - 34.0 pg    MCHC 34.6 31.0 - 36.0 g/dL    RDW 13.8 12.4 - 15.4 %    Platelets 374 165 - 719 K/uL    MPV 8.5 5.0 - 10.5 fL    Neutrophils % 69.4 %    Lymphocytes % 24.4 %    Monocytes % 5.0 %    Eosinophils % 0.7 %    Basophils % 0.5 %    Neutrophils Absolute 7.5 1.7 - 7.7 K/uL    Lymphocytes Absolute 2.6 1.0 - 5.1 K/uL    Monocytes Absolute 0.5 0.0 - 1.3 K/uL    Eosinophils Absolute 0.1 0.0 - 0.6 K/uL    Basophils Absolute 0.0 0.0 - 0.2 K/uL   Comprehensive Metabolic Panel    Collection Time: 01/17/22  3:58 AM   Result Value Ref Range    Sodium 137 136 - 145 mmol/L    Potassium 4.2 3.5 - 5.1 mmol/L    Chloride 103 99 - 110 mmol/L    CO2 21 21 - 32 mmol/L    Anion Gap 13 3 - 16    Glucose 103 (H) 70 - 99 mg/dL BUN 6 (L) 7 - 20 mg/dL    CREATININE 0.6 0.6 - 1.1 mg/dL    GFR Non-African American >60 >60    GFR African American >60 >60    Calcium 8.6 8.3 - 10.6 mg/dL    Total Protein 6.8 6.4 - 8.2 g/dL    Albumin 4.1 3.4 - 5.0 g/dL    Albumin/Globulin Ratio 1.5 1.1 - 2.2    Total Bilirubin 0.3 0.0 - 1.0 mg/dL    Alkaline Phosphatase 62 40 - 129 U/L    ALT 7 (L) 10 - 40 U/L    AST 12 (L) 15 - 37 U/L   HCG, Serum, Qualitative    Collection Time: 01/17/22  3:58 AM   Result Value Ref Range    hCG Qual Negative Detects HCG level >10 MIU/mL   Urine, reflex to culture    Collection Time: 01/17/22  4:34 AM    Specimen: Urine, clean catch   Result Value Ref Range    Color, UA YELLOW Straw/Yellow    Clarity, UA Clear Clear    Glucose, Ur Negative Negative mg/dL    Bilirubin Urine Negative Negative    Ketones, Urine Negative Negative mg/dL    Specific Gravity, UA 1.009 1.005 - 1.030    Blood, Urine Negative Negative    pH, UA 6.0 5.0 - 8.0    Protein, UA Negative Negative mg/dL    Urobilinogen, Urine 0.2 <2.0 E.U./dL    Nitrite, Urine Negative Negative    Leukocyte Esterase, Urine Negative Negative    Microscopic Examination Not Indicated     Urine Type NotGiven     Urine Reflex to Culture Not Indicated         Imaging/Diagnostics Last 24 Hours   US PELVIS COMPLETE    Result Date: 1/17/2022  EXAMINATION: PELVIC ULTRASOUND 1/17/2022 TECHNIQUE: Transabdominal pelvic ultrasound duplex ultrasound using B-mode/gray scaled imaging, Doppler spectral analysis and color flow Doppler was obtained. COMPARISON: 01/11/2022 HISTORY: ORDERING SYSTEM PROVIDED HISTORY: hx of cyst TECHNOLOGIST PROVIDED HISTORY: Reason for exam:->hx of cyst FINDINGS: Measurements: Uterus: 7.4 x 5.3 x 3.5 cm Endometrial stripe: Not measured. Right Ovary:10.0 x 10.1 x 12.3 cm Left Ovary: 3.9 x 4.3 x 3.0 cm Ultrasound Findings: Uterus: Uterus demonstrates normal myometrial echotexture.  Endometrial stripe: No gross endometrial abnormality is evident however the endometrium is poorly visualized. Right Ovary: Again noted is a large simple appearing cyst measuring 9.8 x 11.7 x 9.5 cm precluding accurate Doppler evaluation. No mass identified. Left Ovary:  Left ovary is within normal limits. There is normal arterial and venous Doppler flow. Free Fluid: No evidence of free fluid. Unchanged exam compared to 01/11/2022. Unchanged recommendation for gynecology consult and consideration of pelvic MRI with and without contrast.       Assessment      10 cm simple right ov cyst  Plan   1. Recommend Op Lap drainage and removal of right ov cyst.  I discussed the technique, recovery, and risks with patient. They include but are not limited to bleeding, infection, damage to internal organs (e.g., bladder, bowel, ureters). Patient voiced understanding and agrees to proceed. 2. 30 min  >50% of time was spent discussing findings, management, and treatment options.   3.     Consultations Ordered:  None    Electronically signed by Michael Wooten MD on 5/48/11 at 2:34 PM EST

## 2022-01-17 NOTE — ED PROVIDER NOTES
[address] [place] [address]      Pt Name: Asiya Armando  MRN: [id number]  [place] 1985  Date of evaluation: 1/17/2022  Provider: Alexx Casarez, [address] [place] [address]  Chief Complaint   Patient presents with    Abdominal Pain     pt to ED 1 wk ago and cyst was found on ovary. Scheduled for cyst removal thursday. Complaining of severe RLQ abdominal pain and pressure that began around 0100 am.        I have fully participated in the care of Asiya Armando and have had a face-to-face evaluation. I have reviewed and agree with all pertinent clinical information, and midlevel provider's history, and physical exam. I have also reviewed the labs and imaging studies and treatment plan. I have also reviewed and agree with the medications, allergies and past medical history section for this Asiya Armando. I agree with the diagnosis, and I concur. I wore personal protective equipment when I was in the room the entire time. This includes gloves, N95 mask, face shield, and a glove over my stethoscope for protection. Past Medical History:   Diagnosis Date    Drug abuse ([address]     Endocarditis     Hepatitis C     Lyme disease     MRSA (methicillin resistant staph aureus) culture positive 11/24/14       MDM:  Asiya Armando is a 39 y.o. female who presents with right lower quadrant abdominal pain. She has a history of ovarian cyst.  She is following up with Dr. Rock Dawson. She is going to schedule surgery pretty soon. She is supposed to see them today. However, the pain got worse. So she came to varus department. Physical exam reveals moderate tenderness in the right lower quadrant and right suprapubic area. There are no masses hepatosplenomegaly. Ultrasound of abdomen was ordered and revealed an ovarian cyst that was enlarged but did not see blood flow.   Laboratory work was normal.  I spoke to Dr. Ag Almeida who stated patient can follow-up with Dr. Rebeca Lanza. Today as scheduled. Patient was given prescription for pain medicine and given a dose before she left. She was instructed to follow-up today as scheduled and return if any problems. Vitals:    01/17/22 0545   BP:    Pulse: 60   Resp: 15   Temp:    SpO2: 100%       Lab results  Labs Reviewed   COMPREHENSIVE METABOLIC PANEL - Abnormal; Notable for the following components:       Result Value    Glucose 103 (*)     BUN 6 (*)     ALT 7 (*)     AST 12 (*)     All other components within normal limits    Narrative:     Performed at:  [address] [place]   Phone [phone]   CBC WITH AUTO DIFFERENTIAL    Narrative:     Performed at:  [address] [facility]   Phone [phone]   HCG, SERUM, QUALITATIVE    Narrative:     Performed at:  [address] [facility]   Phone [phone]   URINE RT REFLEX TO CULTURE    Narrative:     Performed at:  [address] [facility]   Phone [phone]     Radiology results  [address]   Final Result   Unchanged exam compared to 01/11/2022. Unchanged recommendation for   gynecology consult and consideration of pelvic MRI with and without contrast.             See discharge instructions for specific medications, discharge information, and treatments. They were verbally instructed to return to emergency if any problems.     Medications   ondansetron (ZOFRAN) injection 4 mg (4 mg IntraVENous Given 1/17/22 0405)   0.9 % sodium chloride bolus (0 mLs IntraVENous Stopped 1/17/22 0523)   ketorolac (TORADOL) injection 15 mg (15 mg IntraVENous Given 1/17/22 0405)   HYDROcodone-acetaminophen (NORCO) 5-325 MG per tablet 1 tablet (1 tablet Oral Given 1/17/22 0600)       Discharge Medication List as of 1/17/2022 5:47 AM      START taking these medications    Details   HYDROcodone-acetaminophen (NORCO) 5-325 MG per tablet Take 1 tablet by mouth every 4 hours as needed for Pain for up to 3 days. Intended supply: 3 days. Take lowest dose possible to manage pain, Disp-18 tablet, R-0Print             The patient's blood pressure was found to be elevated according to CMS/Medicare and the Affordable Care Act/ObamaCare criteria. Elevated blood pressure could occur because of pain or anxiety or other reasons and does not mean that they need to have their blood pressure treated or medications otherwise adjusted. However, this could also be a sign that they will need to have their blood pressure treated or medications changed. The patient was instructed to follow up closely with their personal physician to have their blood pressure rechecked. The patient was instructed to take a list of recent blood pressure readings to their next visit with their personal physician. IMPRESSIONS:  1.  Right ovarian cyst               Jo Ann Cash,   01/17/22 0365

## 2022-01-17 NOTE — Clinical Note
Patti Cespedes was seen and treated in our emergency department on 1/17/2022. She may return to work on 01/19/2022. No restrictions     If you have any questions or concerns, please don't hesitate to call.       Victorina Hernandez RN

## 2022-01-17 NOTE — ED PROVIDER NOTES
[address] [facility]      Pt Name: Casa Bowen  MRN: [id number]  [place] 1985  Date of evaluation: 1/17/2022  Provider: JOAO Light    This patient was seen and evaluated by the attending physician Dr. Jaqueline Gomez. CHIEF COMPLAINT       Chief Complaint   Patient presents with    Abdominal Pain     pt to ED 1 wk ago and cyst was found on ovary. Scheduled for cyst removal thursday. Complaining of severe RLQ abdominal pain and pressure that began around 0100 am.        CRITICAL CARE TIME   I performed a total Critical Care time of  35 minutes, excluding separately reportable procedures. There was a high probability of clinically significant/life threatening deterioration in the patient's condition which required my urgent intervention. Not limited to multiple reexaminations, discussions with attending physician and consultants. HISTORY OF PRESENT ILLNESS  (Location/Symptom, Timing/Onset, Context/Setting, Quality, Duration, Modifying Factors, Severity.)   Casa Bowen is a 39 y.o. female who presents to the emergency department with complaint of right lower abdominal pain. She states that its been intermittent. She was diagnosed with a right-sided ovarian cyst on the 11th of this month. She had follow-up with Dr. Emiliano Benedict of OB/GYN and she has an appointment scheduled on Thursday for cyst removal.  She has had 2 episodes today of \"severe\" pain accompanied by nausea and sweating. The last occurred at 1 AM this morning. She has been taking ibuprofen at home for the pain. Nursing Notes were reviewed and I agree. REVIEW OF SYSTEMS    (2-9 systems for level 4, 10 or more for level 5)     Review of Systems   Constitutional: Positive for diaphoresis. Negative for fever. Cardiovascular: Negative for chest pain. Gastrointestinal: Positive for abdominal pain and nausea. Negative for vomiting.    Skin: Negative for color change, rash and wound. Neurological: Negative for weakness. Psychiatric/Behavioral: Negative for agitation, behavioral problems and confusion. Except as noted above the remainder of the review of systems was reviewed and negative. PAST MEDICAL HISTORY         Diagnosis Date    Anxiety     Drug abuse ([facility]     Endocarditis     H/O: depression     Hepatitis C     Liver lesion     x4-new    Lyme disease     MRSA (methicillin resistant staph aureus) culture positive 14    PTSD (post-traumatic stress disorder)        SURGICAL HISTORY           Procedure Laterality Date     SECTION      FOOT SURGERY Right     to treat mrsa-[id number]    LAPAROSCOPY Right 2022    OPERATIVE LAPAROSCOPY, DRAIN AND REMOVE RIGHT OVARY performed by Andrey Hernández MD at [facility]       Discharge Medication List as of 2022  5:47 AM      CONTINUE these medications which have NOT CHANGED    Details   cephALEXin (KEFLEX) 500 MG capsule Take 1 capsule by mouth 2 times daily for 7 days, Disp-14 capsule, R-0Print      metroNIDAZOLE (METROGEL VAGINAL) 0.75 % vaginal gel apply 1 applicatorful into vagina nightly for 5 nights, Disp-1 each, R-0, Print      hydrOXYzine (VISTARIL) 100 MG capsule Take 100 mg by mouth every 6 hours as needed for Itching or AnxietyHistorical Med      ibuprofen (ADVIL;MOTRIN) 400 MG tablet Take 400 mg by mouth every 6 hours as needed for PainHistorical Med      QUEtiapine (SEROQUEL) 100 MG tablet Take 100 mg by mouth nightly as needed (sleep / insomnia)Historical Med      acetaminophen (APAP EXTRA STRENGTH) 500 MG tablet Take 2 tablets by mouth every 6 hours as needed for Pain DO NOT TAKE WITH OTHER MEDICATIONS CONTAINING ACETAMINOPHEN., Disp-30 tablet, R-0Print             ALLERGIES     Patient has no known allergies.     FAMILY HISTORY           Problem Relation Age of Onset    Abdominal aortic aneurysm Mother     High Blood Pressure Mother     Diabetes Father      Family Status   Relation Name Status    Mother  Alive    Father  Alive        SOCIAL HISTORY      reports that she has been smoking cigarettes. She has a 8.50 pack-year smoking history. She has never used smokeless tobacco. She reports previous drug use. Drugs: IV, Methamphetamines (Crystal Meth), and Marijuana ([place]). She reports that she does not drink alcohol. PHYSICAL EXAM    (up to 7 for level 4, 8 or more for level 5)     ED Triage Vitals [01/17/22 0146]   BP Temp Temp Source Pulse Resp SpO2 Height Weight   121/74 97.9 °F (36.6 °C) Tympanic 82 18 99 % -- --       Physical Exam  Vitals and nursing note reviewed. Constitutional:       Appearance: She is well-developed. HENT:      Head: Normocephalic and atraumatic. Cardiovascular:      Rate and Rhythm: Normal rate. Pulmonary:      Effort: Pulmonary effort is normal. No respiratory distress. Abdominal:      Tenderness: There is abdominal tenderness in the right lower quadrant. There is no guarding or rebound. Skin:     General: Skin is warm. Neurological:      General: No focal deficit present. Mental Status: She is alert and oriented to person, place, and time. Psychiatric:         Mood and Affect: Mood normal.         Behavior: Behavior normal.         DIAGNOSTIC RESULTS     RADIOLOGY:   Non-plain film images such as CT, Ultrasound and MRI are read by the radiologist. Plain radiographic images are visualized and preliminarily interpreted by JOAO Jaquez with the below findings:    Reviewed radiologist's interpretation. Interpretation per the Radiologist below, if available at the time of this note:    [address]   Final Result   Unchanged exam compared to 01/11/2022.   Unchanged recommendation for   gynecology consult and consideration of pelvic MRI with and without contrast.               LABS:  Labs Reviewed   COMPREHENSIVE METABOLIC PANEL - Abnormal; Notable for the following components:       Result Value    Glucose 103 (*)     BUN 6 (*)     ALT 7 (*)     AST 12 (*)     All other components within normal limits    Narrative:     Performed at:  [facility]  [address] [facility] [place] [place] [address]   Phone [phone]   CBC WITH AUTO DIFFERENTIAL    Narrative:     Performed at:  [facility]  [address] [facility] [place] [facility]   Phone [phone]   HCG, SERUM, QUALITATIVE    Narrative:     Performed at:  [facility]  [address] [place] [address]   Phone [phone]   URINE RT REFLEX TO CULTURE    Narrative:     Performed at:  [facility]  [address] [place] [address]   Phone [phone]       All other labs were within normal range or not returned as of this dictation. EMERGENCY DEPARTMENT COURSE and DIFFERENTIAL DIAGNOSIS/MDM:   Vitals:    Vitals:    01/17/22 0146 01/17/22 0545   BP: 121/74    Pulse: 82 60   Resp: 18 15   Temp: 97.9 °F (36.6 °C)    TempSrc: Tympanic    SpO2: 99% 100%     Patient had a 10 cm cyst on ultrasound imaging 1/11/2022. Has an appointment on Thursday for follow-up for cyst removal.  Worsening pain at 1 AM this morning. Will ultrasound to rule out torsion. Lab work and urine ordered. At the end of my shift entirety of the patient's care was transferred to the attending physician. CONSULTS:  IP CONSULT TO OB GYN    PROCEDURES:  Procedures      FINAL IMPRESSION      1.  Right ovarian cyst          DISPOSITION/PLAN   DISPOSITION Decision To Discharge 01/17/2022 05:35:15 AM      PATIENT REFERRED TO:  Rodolfo Escobar MD  [address] [facility] [place] [address]  [phone]    Today  As scheduled      DISCHARGE MEDICATIONS:  Discharge Medication List as of 1/17/2022  5:47 AM      START taking these medications    Details   HYDROcodone-acetaminophen ([address]) 5-325 MG per tablet Take 1 tablet by mouth every 4 hours as needed for Pain for up to 3 days. Intended supply: 3 days.  Take lowest dose possible to manage pain, Disp-18 tablet, R-0Print             (Please note that portions of this note were completed with a voice recognition program.  Efforts were made to edit the dictations but occasionally words are mis-transcribed.)    Codie South [address]  01/25/22 2558

## 2022-01-17 NOTE — TELEPHONE ENCOUNTER
Patient calling, stating she is not able to locate her rx for pain relief, patient states she did not fill the rx,    Patient requesting another rx for pain relief, in the case she has a flare up prior to her sx this week. Per dr machuca,  We will have the rx ready to be picked up at the  tomorrow.     Patient advised of the above, and stated she will  the rx at the  on 01/18/22

## 2022-01-18 RX ORDER — IBUPROFEN 800 MG/1
800 TABLET ORAL PRN
COMMUNITY

## 2022-01-18 RX ORDER — BUPRENORPHINE 100 MG/1
100 SOLUTION SUBCUTANEOUS
COMMUNITY
Start: 2021-12-01

## 2022-01-18 NOTE — PROGRESS NOTES
C-Difficile admission screening and protocol:       * Admitted with diarrhea? [] YES    [x]  NO     *Prior history of C-Diff. In last 3 months? [] YES    [x]  NO     *Antibiotic use in the past 6-8 weeks? []  NO    [x]  YES                 If yes, which ANTIBIOTIC AND REASON__keflex-uti___     *Prior hospitalization or nursing home in the last month?  [x]  YES    []  NO

## 2022-01-18 NOTE — PROGRESS NOTES
Preoperative Screening for Elective Surgery/Invasive Procedures While COVID-19 present in the community     1. Have you tested positive or have been told to self-isolate for COVID-19 like symptoms within the past 28 days?no tested positive 12/2020  2. Do you currently have any of the following symptoms?no  ? Fever >100.0 F or 99.9 F in immunocompromised patients? no  ? New onset cough, shortness of breath or difficulty breathing? no  ? New onset sore throat, myalgia (muscle aches and pains), headache, loss of taste/smell or diarrhea? no  3. Have you had a potential exposure to COVID-19 within the past 14 days by:no  ? Close contact with a confirmed case? no  ? Close contact with a healthcare worker,  or essential infrastructure worker (grocery store, [employer], gas station, public utilities or transportation)?no  ? Do you reside in a congregate setting such as; skilled nursing facility, adult home, correctional facility, homeless shelter or other institutional setting? no  ? Have you had recent travel to a known COVID-19 hotspot? no     Indicate if the patient has a positive screen by answering yes to one or more of the above questions.

## 2022-01-18 NOTE — PROGRESS NOTES
[address] [facility] time__0825__________        Surgery time__0955__________    Take the following medications with a sip of water: Follow your MD/Surgeons pre-procedure instructions regarding your medications    Do not eat or drink anything after 12:00 midnight prior to your surgery. This includes water chewing gum, mints and ice chips. You may brush your teeth and gargle the morning of your surgery, but do not swallow the water     Please see your family doctor/pediatrician for a history and physical and/or concerning medications. Bring any test results/reports from your physicians office. If you are under the care of a heart doctor or specialist doctor, please be aware that you may be asked to them for clearance    You may be asked to stop blood thinners such as Coumadin, Plavix, Fragmin, Lovenox, etc., or any anti-inflammatories such as:  Aspirin, Ibuprofen, Advil, Naproxen prior to your surgery. We also ask that you stop any OTC medications such as fish oil, vitamin E, glucosamine, garlic, Multivitamins, COQ 10, etc. May take tylenol    We ask that you do not smoke 24 hours prior to surgery no marijuana  We ask that you do not  drink any alcoholic beverages 24 hours prior to surgery     You must make arrangements for a responsible adult to take you home after your surgery. For your safety you will not be allowed to leave alone or drive yourself home. Your surgery will be cancelled if you do not have a ride home. Also for your safety, it is strongly suggested that someone stay with you the first 24 hours after your surgery. A parent or legal guardian must accompany a child scheduled for surgery and plan to stay at the hospital until the child is discharged. Please do not bring other children with you. For your comfort, please wear simple loose fitting clothing to the hospital.  Please do not bring valuables.     Do not wear any make-up or nail polish on your fingers or toes      For your safety, please do not wear any jewelry or body piercing's on the day of surgery. All jewelry must be removed. If you have dentures, they will be removed before going to operating room. For your convenience, we will provide you with a container. If you wear contact lenses or glasses, they will be removed, please bring a case for them. If you have a living will and a durable power of  for healthcare, please bring in a copy. As part of our patient safety program to minimize surgical site infections, we ask you to do the following:    · Please notify your surgeon if you develop any illness between         now and the  day of your surgery. · This includes a cough, cold, fever, sore throat, nausea,         or vomiting, and diarrhea, etc.  ·  Please notify your surgeon if you experience dizziness, shortness         of breath or blurred vision between now and the time of your surgery. Do not shave your operative site 96 hours prior to surgery. For face and neck surgery, men may use an electric razor 48 hours   prior to surgery. You may shower the night before surgery or the morning of   your surgery with an antibacterial soap. You will need to bring a photo ID and insurance card    [facility] has an onsite pharmacy, would you like to utilize our pharmacy     If you will be staying overnight and use a C-pap machine, please bring   your C-pap to hospital     Our goal is to provide you with excellent care, therefore, visitors will be limited to two(2) in the room at a time so that we may focus on providing this care for you. Please contact pre-admission testing if you have any further questions.                  [facility] phone number:  [phone] PAT fax number:  [phone]  Please note these are generalized instructions for all surgical cases, you may be provided with more specific instructions according to your surgery. SAFETY FIRST. .call before you fall    Unfortunately due the rise in covid cases, staffing crisis and hospital capacity, your procedure may need to be rescheduled--if this were to happen your Surgeons office will notify you ASAP

## 2022-01-19 ENCOUNTER — ANESTHESIA EVENT (OUTPATIENT)
Dept: OPERATING ROOM | Age: 37
End: 2022-01-19
Payer: COMMERCIAL

## 2022-01-20 ENCOUNTER — HOSPITAL ENCOUNTER (OUTPATIENT)
Age: 37
Setting detail: OUTPATIENT SURGERY
Discharge: HOME | End: 2022-01-20
Attending: OBSTETRICS & GYNECOLOGY | Admitting: OBSTETRICS & GYNECOLOGY
Payer: COMMERCIAL

## 2022-01-20 ENCOUNTER — ANESTHESIA (OUTPATIENT)
Dept: OPERATING ROOM | Age: 37
End: 2022-01-20
Payer: COMMERCIAL

## 2022-01-20 VITALS
DIASTOLIC BLOOD PRESSURE: 58 MMHG | SYSTOLIC BLOOD PRESSURE: 116 MMHG | WEIGHT: 168.9 LBS | RESPIRATION RATE: 18 BRPM | HEART RATE: 61 BPM | OXYGEN SATURATION: 99 % | BODY MASS INDEX: 27.14 KG/M2 | TEMPERATURE: 97 F | HEIGHT: 66 IN

## 2022-01-20 VITALS
SYSTOLIC BLOOD PRESSURE: 98 MMHG | RESPIRATION RATE: 6 BRPM | TEMPERATURE: 96.3 F | DIASTOLIC BLOOD PRESSURE: 57 MMHG | OXYGEN SATURATION: 98 %

## 2022-01-20 DIAGNOSIS — N83.201 RIGHT OVARIAN CYST: ICD-10-CM

## 2022-01-20 LAB
ABO/RH: NORMAL
ANTIBODY SCREEN: NORMAL
PREGNANCY, URINE: NEGATIVE
SARS-COV-2, NAAT: NOT DETECTED

## 2022-01-20 PROCEDURE — 86901 BLOOD TYPING SEROLOGIC RH(D): CPT

## 2022-01-20 PROCEDURE — 2720000010 HC SURG SUPPLY STERILE: Performed by: OBSTETRICS & GYNECOLOGY

## 2022-01-20 PROCEDURE — 86900 BLOOD TYPING SEROLOGIC ABO: CPT

## 2022-01-20 PROCEDURE — 2709999900 HC NON-CHARGEABLE SUPPLY: Performed by: OBSTETRICS & GYNECOLOGY

## 2022-01-20 PROCEDURE — 7100000001 HC PACU RECOVERY - ADDTL 15 MIN: Performed by: OBSTETRICS & GYNECOLOGY

## 2022-01-20 PROCEDURE — 6360000002 HC RX W HCPCS: Performed by: STUDENT IN AN ORGANIZED HEALTH CARE EDUCATION/TRAINING PROGRAM

## 2022-01-20 PROCEDURE — 2500000003 HC RX 250 WO HCPCS

## 2022-01-20 PROCEDURE — 7100000010 HC PHASE II RECOVERY - FIRST 15 MIN: Performed by: OBSTETRICS & GYNECOLOGY

## 2022-01-20 PROCEDURE — 7100000000 HC PACU RECOVERY - FIRST 15 MIN: Performed by: OBSTETRICS & GYNECOLOGY

## 2022-01-20 PROCEDURE — 58661 LAPAROSCOPY REMOVE ADNEXA: CPT | Performed by: OBSTETRICS & GYNECOLOGY

## 2022-01-20 PROCEDURE — 2580000003 HC RX 258: Performed by: ANESTHESIOLOGY

## 2022-01-20 PROCEDURE — 6370000000 HC RX 637 (ALT 250 FOR IP): Performed by: STUDENT IN AN ORGANIZED HEALTH CARE EDUCATION/TRAINING PROGRAM

## 2022-01-20 PROCEDURE — 88307 TISSUE EXAM BY PATHOLOGIST: CPT

## 2022-01-20 PROCEDURE — 2500000003 HC RX 250 WO HCPCS: Performed by: STUDENT IN AN ORGANIZED HEALTH CARE EDUCATION/TRAINING PROGRAM

## 2022-01-20 PROCEDURE — 3600000004 HC SURGERY LEVEL 4 BASE: Performed by: OBSTETRICS & GYNECOLOGY

## 2022-01-20 PROCEDURE — 3700000001 HC ADD 15 MINUTES (ANESTHESIA): Performed by: OBSTETRICS & GYNECOLOGY

## 2022-01-20 PROCEDURE — 6360000002 HC RX W HCPCS: Performed by: OBSTETRICS & GYNECOLOGY

## 2022-01-20 PROCEDURE — 86850 RBC ANTIBODY SCREEN: CPT

## 2022-01-20 PROCEDURE — 6360000002 HC RX W HCPCS

## 2022-01-20 PROCEDURE — 3600000014 HC SURGERY LEVEL 4 ADDTL 15MIN: Performed by: OBSTETRICS & GYNECOLOGY

## 2022-01-20 PROCEDURE — 7100000011 HC PHASE II RECOVERY - ADDTL 15 MIN: Performed by: OBSTETRICS & GYNECOLOGY

## 2022-01-20 PROCEDURE — 84703 CHORIONIC GONADOTROPIN ASSAY: CPT

## 2022-01-20 PROCEDURE — 3700000000 HC ANESTHESIA ATTENDED CARE: Performed by: OBSTETRICS & GYNECOLOGY

## 2022-01-20 PROCEDURE — 64488 TAP BLOCK BI INJECTION: CPT | Performed by: STUDENT IN AN ORGANIZED HEALTH CARE EDUCATION/TRAINING PROGRAM

## 2022-01-20 PROCEDURE — 87635 SARS-COV-2 COVID-19 AMP PRB: CPT

## 2022-01-20 RX ORDER — HYDRALAZINE HYDROCHLORIDE 20 MG/ML
5 INJECTION INTRAMUSCULAR; INTRAVENOUS EVERY 10 MIN PRN
Status: DISCONTINUED | OUTPATIENT
Start: 2022-01-20 | End: 2022-01-20 | Stop reason: HOSPADM

## 2022-01-20 RX ORDER — OXYCODONE HYDROCHLORIDE AND ACETAMINOPHEN 5; 325 MG/1; MG/1
2 TABLET ORAL PRN
Status: DISCONTINUED | OUTPATIENT
Start: 2022-01-20 | End: 2022-01-20 | Stop reason: HOSPADM

## 2022-01-20 RX ORDER — FENTANYL CITRATE 50 UG/ML
INJECTION, SOLUTION INTRAMUSCULAR; INTRAVENOUS PRN
Status: DISCONTINUED | OUTPATIENT
Start: 2022-01-20 | End: 2022-01-20 | Stop reason: SDUPTHER

## 2022-01-20 RX ORDER — ONDANSETRON 2 MG/ML
INJECTION INTRAMUSCULAR; INTRAVENOUS PRN
Status: DISCONTINUED | OUTPATIENT
Start: 2022-01-20 | End: 2022-01-20 | Stop reason: SDUPTHER

## 2022-01-20 RX ORDER — GLYCOPYRROLATE 0.2 MG/ML
INJECTION INTRAMUSCULAR; INTRAVENOUS PRN
Status: DISCONTINUED | OUTPATIENT
Start: 2022-01-20 | End: 2022-01-20 | Stop reason: SDUPTHER

## 2022-01-20 RX ORDER — KETAMINE HCL IN NACL, ISO-OSM 100MG/10ML
SYRINGE (ML) INJECTION PRN
Status: DISCONTINUED | OUTPATIENT
Start: 2022-01-20 | End: 2022-01-20 | Stop reason: SDUPTHER

## 2022-01-20 RX ORDER — ROCURONIUM BROMIDE 10 MG/ML
INJECTION, SOLUTION INTRAVENOUS PRN
Status: DISCONTINUED | OUTPATIENT
Start: 2022-01-20 | End: 2022-01-20 | Stop reason: SDUPTHER

## 2022-01-20 RX ORDER — LIDOCAINE HYDROCHLORIDE 40 MG/ML
SOLUTION TOPICAL PRN
Status: DISCONTINUED | OUTPATIENT
Start: 2022-01-20 | End: 2022-01-20 | Stop reason: SDUPTHER

## 2022-01-20 RX ORDER — SODIUM CHLORIDE 9 MG/ML
25 INJECTION, SOLUTION INTRAVENOUS PRN
Status: DISCONTINUED | OUTPATIENT
Start: 2022-01-20 | End: 2022-01-20 | Stop reason: SDUPTHER

## 2022-01-20 RX ORDER — SODIUM CHLORIDE 9 MG/ML
INJECTION, SOLUTION INTRAVENOUS CONTINUOUS
Status: DISCONTINUED | OUTPATIENT
Start: 2022-01-20 | End: 2022-01-20 | Stop reason: HOSPADM

## 2022-01-20 RX ORDER — PROCHLORPERAZINE EDISYLATE 5 MG/ML
5 INJECTION INTRAMUSCULAR; INTRAVENOUS
Status: DISCONTINUED | OUTPATIENT
Start: 2022-01-20 | End: 2022-01-20 | Stop reason: HOSPADM

## 2022-01-20 RX ORDER — SODIUM CHLORIDE 0.9 % (FLUSH) 0.9 %
10 SYRINGE (ML) INJECTION PRN
Status: DISCONTINUED | OUTPATIENT
Start: 2022-01-20 | End: 2022-01-20 | Stop reason: HOSPADM

## 2022-01-20 RX ORDER — SODIUM CHLORIDE 0.9 % (FLUSH) 0.9 %
5-40 SYRINGE (ML) INJECTION EVERY 12 HOURS SCHEDULED
Status: DISCONTINUED | OUTPATIENT
Start: 2022-01-20 | End: 2022-01-20 | Stop reason: SDUPTHER

## 2022-01-20 RX ORDER — SODIUM CHLORIDE 9 MG/ML
25 INJECTION, SOLUTION INTRAVENOUS PRN
Status: DISCONTINUED | OUTPATIENT
Start: 2022-01-20 | End: 2022-01-20 | Stop reason: HOSPADM

## 2022-01-20 RX ORDER — LABETALOL HYDROCHLORIDE 5 MG/ML
5 INJECTION, SOLUTION INTRAVENOUS EVERY 10 MIN PRN
Status: DISCONTINUED | OUTPATIENT
Start: 2022-01-20 | End: 2022-01-20 | Stop reason: HOSPADM

## 2022-01-20 RX ORDER — LIDOCAINE HYDROCHLORIDE 20 MG/ML
INJECTION, SOLUTION EPIDURAL; INFILTRATION; INTRACAUDAL; PERINEURAL PRN
Status: DISCONTINUED | OUTPATIENT
Start: 2022-01-20 | End: 2022-01-20 | Stop reason: SDUPTHER

## 2022-01-20 RX ORDER — DEXMEDETOMIDINE HYDROCHLORIDE 100 UG/ML
INJECTION, SOLUTION INTRAVENOUS PRN
Status: DISCONTINUED | OUTPATIENT
Start: 2022-01-20 | End: 2022-01-20 | Stop reason: SDUPTHER

## 2022-01-20 RX ORDER — SUCCINYLCHOLINE/SOD CL,ISO/PF 200MG/10ML
SYRINGE (ML) INTRAVENOUS PRN
Status: DISCONTINUED | OUTPATIENT
Start: 2022-01-20 | End: 2022-01-20 | Stop reason: SDUPTHER

## 2022-01-20 RX ORDER — OXYCODONE HYDROCHLORIDE AND ACETAMINOPHEN 5; 325 MG/1; MG/1
1 TABLET ORAL PRN
Status: DISCONTINUED | OUTPATIENT
Start: 2022-01-20 | End: 2022-01-20 | Stop reason: HOSPADM

## 2022-01-20 RX ORDER — BUPIVACAINE HYDROCHLORIDE 2.5 MG/ML
INJECTION, SOLUTION EPIDURAL; INFILTRATION; INTRACAUDAL
Status: COMPLETED | OUTPATIENT
Start: 2022-01-20 | End: 2022-01-20

## 2022-01-20 RX ORDER — HYDROCODONE BITARTRATE AND ACETAMINOPHEN 5; 325 MG/1; MG/1
1 TABLET ORAL EVERY 4 HOURS PRN
Qty: 30 TABLET | Refills: 0 | Status: SHIPPED | OUTPATIENT
Start: 2022-01-20 | End: 2022-01-25

## 2022-01-20 RX ORDER — PROPOFOL 10 MG/ML
INJECTION, EMULSION INTRAVENOUS PRN
Status: DISCONTINUED | OUTPATIENT
Start: 2022-01-20 | End: 2022-01-20 | Stop reason: SDUPTHER

## 2022-01-20 RX ORDER — MIDAZOLAM HYDROCHLORIDE 1 MG/ML
INJECTION INTRAMUSCULAR; INTRAVENOUS PRN
Status: DISCONTINUED | OUTPATIENT
Start: 2022-01-20 | End: 2022-01-20 | Stop reason: SDUPTHER

## 2022-01-20 RX ORDER — SODIUM CHLORIDE, SODIUM LACTATE, POTASSIUM CHLORIDE, CALCIUM CHLORIDE 600; 310; 30; 20 MG/100ML; MG/100ML; MG/100ML; MG/100ML
INJECTION, SOLUTION INTRAVENOUS CONTINUOUS
Status: DISCONTINUED | OUTPATIENT
Start: 2022-01-20 | End: 2022-01-20 | Stop reason: SDUPTHER

## 2022-01-20 RX ORDER — ONDANSETRON 2 MG/ML
4 INJECTION INTRAMUSCULAR; INTRAVENOUS
Status: DISCONTINUED | OUTPATIENT
Start: 2022-01-20 | End: 2022-01-20 | Stop reason: HOSPADM

## 2022-01-20 RX ORDER — DEXAMETHASONE SODIUM PHOSPHATE 4 MG/ML
INJECTION, SOLUTION INTRA-ARTICULAR; INTRALESIONAL; INTRAMUSCULAR; INTRAVENOUS; SOFT TISSUE PRN
Status: DISCONTINUED | OUTPATIENT
Start: 2022-01-20 | End: 2022-01-20 | Stop reason: SDUPTHER

## 2022-01-20 RX ORDER — LORAZEPAM 2 MG/ML
1 INJECTION INTRAMUSCULAR
Status: DISCONTINUED | OUTPATIENT
Start: 2022-01-20 | End: 2022-01-20 | Stop reason: HOSPADM

## 2022-01-20 RX ORDER — SODIUM CHLORIDE 0.9 % (FLUSH) 0.9 %
5-40 SYRINGE (ML) INJECTION PRN
Status: DISCONTINUED | OUTPATIENT
Start: 2022-01-20 | End: 2022-01-20 | Stop reason: SDUPTHER

## 2022-01-20 RX ORDER — KETOROLAC TROMETHAMINE 30 MG/ML
INJECTION, SOLUTION INTRAMUSCULAR; INTRAVENOUS PRN
Status: DISCONTINUED | OUTPATIENT
Start: 2022-01-20 | End: 2022-01-20 | Stop reason: SDUPTHER

## 2022-01-20 RX ORDER — SODIUM CHLORIDE 0.9 % (FLUSH) 0.9 %
10 SYRINGE (ML) INJECTION EVERY 12 HOURS SCHEDULED
Status: DISCONTINUED | OUTPATIENT
Start: 2022-01-20 | End: 2022-01-20 | Stop reason: HOSPADM

## 2022-01-20 RX ADMIN — DEXMEDETOMIDINE HYDROCHLORIDE 4 MCG: 100 INJECTION, SOLUTION INTRAVENOUS at 11:37

## 2022-01-20 RX ADMIN — LIDOCAINE HYDROCHLORIDE 100 MG: 20 INJECTION, SOLUTION EPIDURAL; INFILTRATION; INTRACAUDAL; PERINEURAL at 10:58

## 2022-01-20 RX ADMIN — ROCURONIUM BROMIDE 20 MG: 10 INJECTION INTRAVENOUS at 11:55

## 2022-01-20 RX ADMIN — ROCURONIUM BROMIDE 30 MG: 10 INJECTION INTRAVENOUS at 11:10

## 2022-01-20 RX ADMIN — SODIUM CHLORIDE: 9 INJECTION, SOLUTION INTRAVENOUS at 09:29

## 2022-01-20 RX ADMIN — SODIUM CHLORIDE: 9 INJECTION, SOLUTION INTRAVENOUS at 11:59

## 2022-01-20 RX ADMIN — SUGAMMADEX 100 MG: 100 INJECTION, SOLUTION INTRAVENOUS at 12:10

## 2022-01-20 RX ADMIN — LIDOCAINE HYDROCHLORIDE 4 ML: 40 SOLUTION TOPICAL at 11:00

## 2022-01-20 RX ADMIN — ONDANSETRON 4 MG: 2 INJECTION INTRAMUSCULAR; INTRAVENOUS at 12:07

## 2022-01-20 RX ADMIN — DEXMEDETOMIDINE HYDROCHLORIDE 4 MCG: 100 INJECTION, SOLUTION INTRAVENOUS at 12:17

## 2022-01-20 RX ADMIN — HYDROMORPHONE HYDROCHLORIDE 0.25 MG: 1 INJECTION, SOLUTION INTRAMUSCULAR; INTRAVENOUS; SUBCUTANEOUS at 12:10

## 2022-01-20 RX ADMIN — DEXAMETHASONE SODIUM PHOSPHATE 10 MG: 4 INJECTION, SOLUTION INTRAMUSCULAR; INTRAVENOUS at 11:05

## 2022-01-20 RX ADMIN — Medication 10 MG: at 11:10

## 2022-01-20 RX ADMIN — MIDAZOLAM 2 MG: 1 INJECTION INTRAMUSCULAR; INTRAVENOUS at 09:20

## 2022-01-20 RX ADMIN — CEFAZOLIN SODIUM 2000 MG: 10 INJECTION, POWDER, FOR SOLUTION INTRAVENOUS at 11:05

## 2022-01-20 RX ADMIN — BUPIVACAINE HYDROCHLORIDE 40 ML: 2.5 INJECTION, SOLUTION EPIDURAL; INFILTRATION; INTRACAUDAL at 09:25

## 2022-01-20 RX ADMIN — GLYCOPYRROLATE 0.2 MG: 0.2 INJECTION, SOLUTION INTRAMUSCULAR; INTRAVENOUS at 10:58

## 2022-01-20 RX ADMIN — FENTANYL CITRATE 100 MCG: 50 INJECTION INTRAMUSCULAR; INTRAVENOUS at 10:58

## 2022-01-20 RX ADMIN — KETOROLAC TROMETHAMINE 30 MG: 30 INJECTION, SOLUTION INTRAMUSCULAR at 12:07

## 2022-01-20 RX ADMIN — DEXMEDETOMIDINE HYDROCHLORIDE 4 MCG: 100 INJECTION, SOLUTION INTRAVENOUS at 11:36

## 2022-01-20 RX ADMIN — HYDROMORPHONE HYDROCHLORIDE 0.5 MG: 1 INJECTION, SOLUTION INTRAMUSCULAR; INTRAVENOUS; SUBCUTANEOUS at 12:17

## 2022-01-20 RX ADMIN — HYDROMORPHONE HYDROCHLORIDE 0.5 MG: 1 INJECTION, SOLUTION INTRAMUSCULAR; INTRAVENOUS; SUBCUTANEOUS at 13:01

## 2022-01-20 RX ADMIN — HYDROMORPHONE HYDROCHLORIDE 0.25 MG: 1 INJECTION, SOLUTION INTRAMUSCULAR; INTRAVENOUS; SUBCUTANEOUS at 11:32

## 2022-01-20 RX ADMIN — Medication 140 MG: at 10:58

## 2022-01-20 RX ADMIN — Medication 20 MG: at 11:22

## 2022-01-20 RX ADMIN — SUGAMMADEX 100 MG: 100 INJECTION, SOLUTION INTRAVENOUS at 10:49

## 2022-01-20 RX ADMIN — PROPOFOL 200 MG: 10 INJECTION, EMULSION INTRAVENOUS at 10:58

## 2022-01-20 ASSESSMENT — PULMONARY FUNCTION TESTS
PIF_VALUE: 25
PIF_VALUE: 17
PIF_VALUE: 5
PIF_VALUE: 17
PIF_VALUE: 26
PIF_VALUE: 17
PIF_VALUE: 20
PIF_VALUE: 2
PIF_VALUE: 17
PIF_VALUE: 2
PIF_VALUE: 27
PIF_VALUE: 2
PIF_VALUE: 27
PIF_VALUE: 27
PIF_VALUE: 25
PIF_VALUE: 28
PIF_VALUE: 2
PIF_VALUE: 27
PIF_VALUE: 25
PIF_VALUE: 5
PIF_VALUE: 28
PIF_VALUE: 18
PIF_VALUE: 27
PIF_VALUE: 4
PIF_VALUE: 17
PIF_VALUE: 27
PIF_VALUE: 18
PIF_VALUE: 33
PIF_VALUE: 27
PIF_VALUE: 28
PIF_VALUE: 18
PIF_VALUE: 2
PIF_VALUE: 27
PIF_VALUE: 5
PIF_VALUE: 18
PIF_VALUE: 26
PIF_VALUE: 17
PIF_VALUE: 19
PIF_VALUE: 18
PIF_VALUE: 17
PIF_VALUE: 2
PIF_VALUE: 26
PIF_VALUE: 28
PIF_VALUE: 27
PIF_VALUE: 18
PIF_VALUE: 2
PIF_VALUE: 22
PIF_VALUE: 27
PIF_VALUE: 18
PIF_VALUE: 2
PIF_VALUE: 18
PIF_VALUE: 27
PIF_VALUE: 18
PIF_VALUE: 28
PIF_VALUE: 17
PIF_VALUE: 17
PIF_VALUE: 19
PIF_VALUE: 4
PIF_VALUE: 27
PIF_VALUE: 20
PIF_VALUE: 28
PIF_VALUE: 27
PIF_VALUE: 27
PIF_VALUE: 17
PIF_VALUE: 31
PIF_VALUE: 2
PIF_VALUE: 2
PIF_VALUE: 24
PIF_VALUE: 1
PIF_VALUE: 27
PIF_VALUE: 20
PIF_VALUE: 17
PIF_VALUE: 27
PIF_VALUE: 17
PIF_VALUE: 27
PIF_VALUE: 1
PIF_VALUE: 14
PIF_VALUE: 18
PIF_VALUE: 27
PIF_VALUE: 3
PIF_VALUE: 0
PIF_VALUE: 19
PIF_VALUE: 27
PIF_VALUE: 17
PIF_VALUE: 19
PIF_VALUE: 2
PIF_VALUE: 29
PIF_VALUE: 26
PIF_VALUE: 26
PIF_VALUE: 2
PIF_VALUE: 26
PIF_VALUE: 27
PIF_VALUE: 28
PIF_VALUE: 20
PIF_VALUE: 24
PIF_VALUE: 3

## 2022-01-20 ASSESSMENT — PAIN DESCRIPTION - PAIN TYPE
TYPE: SURGICAL PAIN

## 2022-01-20 ASSESSMENT — PAIN DESCRIPTION - LOCATION
LOCATION: ABDOMEN

## 2022-01-20 ASSESSMENT — LIFESTYLE VARIABLES: SMOKING_STATUS: 1

## 2022-01-20 ASSESSMENT — PAIN DESCRIPTION - FREQUENCY
FREQUENCY: CONTINUOUS
FREQUENCY: INTERMITTENT
FREQUENCY: CONTINUOUS
FREQUENCY: CONTINUOUS

## 2022-01-20 ASSESSMENT — PAIN DESCRIPTION - DESCRIPTORS
DESCRIPTORS: PRESSURE
DESCRIPTORS: TENDER
DESCRIPTORS: PRESSURE
DESCRIPTORS: PRESSURE

## 2022-01-20 ASSESSMENT — PAIN SCALES - GENERAL
PAINLEVEL_OUTOF10: 2
PAINLEVEL_OUTOF10: 1
PAINLEVEL_OUTOF10: 6
PAINLEVEL_OUTOF10: 2

## 2022-01-20 ASSESSMENT — PAIN - FUNCTIONAL ASSESSMENT
PAIN_FUNCTIONAL_ASSESSMENT: 0-10
PAIN_FUNCTIONAL_ASSESSMENT: ACTIVITIES ARE NOT PREVENTED
PAIN_FUNCTIONAL_ASSESSMENT: ACTIVITIES ARE NOT PREVENTED

## 2022-01-20 ASSESSMENT — PAIN DESCRIPTION - PROGRESSION
CLINICAL_PROGRESSION: NOT CHANGED
CLINICAL_PROGRESSION: GRADUALLY IMPROVING
CLINICAL_PROGRESSION: NOT CHANGED
CLINICAL_PROGRESSION: NOT CHANGED

## 2022-01-20 ASSESSMENT — PAIN DESCRIPTION - ORIENTATION
ORIENTATION: ANTERIOR

## 2022-01-20 ASSESSMENT — PAIN DESCRIPTION - ONSET
ONSET: ON-GOING
ONSET: ON-GOING
ONSET: GRADUAL
ONSET: ON-GOING

## 2022-01-20 NOTE — ANESTHESIA PROCEDURE NOTES
Peripheral Block    Patient location during procedure: pre-op  Start time: 1/20/2022 9:20 AM  End time: 1/20/2022 9:26 AM  Staffing  Performed: anesthesiologist   Anesthesiologist: Isaac Tanner MD  Preanesthetic Checklist  Completed: patient identified, IV checked, site marked, risks and benefits discussed, surgical consent, monitors and equipment checked, pre-op evaluation, timeout performed, anesthesia consent given, oxygen available and patient being monitored  Peripheral Block  Patient position: left lateral decubitus (then lateral decubitus)  Prep: ChloraPrep  Patient monitoring: cardiac monitor, continuous pulse ox, frequent blood pressure checks and IV access  Block type: TAP  Laterality: bilateral  Injection technique: single-shot  Guidance: ultrasound guided  Local infiltration: lidocaine  Infiltration strength: 1 %  Dose: 3 mL  Local infiltration: lidocaine  Needle  Needle type: combined needle/nerve stimulator   Needle gauge: 20 G  Needle length: 10 cm  Needle localization: ultrasound guidance and anatomical landmarks  Assessment  Injection assessment: negative aspiration for heme, no paresthesia on injection and local visualized surrounding nerve on ultrasound  Paresthesia pain: none  Slow fractionated injection: yes  Hemodynamics: stable  Additional Notes  Immediately prior to procedure a \"time out\" was called to verify the correct patient, allergies, laterality, procedure and equipment. Time out performed with Gely RIVAS    Local Anesthetic: 0.25 %  Bupivacaine   Amount: 40 ml  in 5 ml increments (20cc instilled on each side) after negative aspiration each time. External Oblique muscle, Internal Oblique muscle, Transversus Abdominis muscle are identified; the tip of the needle and the spread of the local anesthetic between the Internal Oblique muscle and the Transversus Abdominis muscles are visualized.  The muscles appeared to be anatomically normal and there were no abnormal pathologically findings seen. Good spread of local anesthetic visualized on ultrasound. Image printed to be attached to paper chart.         Medications Administered  Bupivacaine (MARCAINE) PF injection 0.25%, 40 mL  Reason for block: post-op pain management and at surgeon's request

## 2022-01-20 NOTE — H&P
Date of Surgery Update:  Mitali Pena was seen, history and physical examination reviewed, and patient examined by me today. There have been no significant clinical changes since the completion of the previous history and physical.    The risk, benefits, and alternatives of the proposed procedure have been explained to the patient (or appropriate guardian) and understanding verbalized. All questions answered. Patient wishes to proceed.     Electronically signed by: LISBETH Mack,3/06/1540,6:68 AM

## 2022-01-20 NOTE — PROGRESS NOTES
CLINICAL PHARMACY NOTE: MEDS TO BEDS    Total # of Prescriptions Filled: 1   The following medications were delivered to the patient:  Discharge Medication List as of 1/20/2022  1:19 PM      · Hydrocodone APAP 5-325mg tabs  ·     Additional Documentation:

## 2022-01-20 NOTE — PROGRESS NOTES
Received from PACU. Admitted to Phase 2 care. Awake and alert, respirations easy and even. Oriented to room and surroundings. States pain is tolerable. Assisted up to BR to void and then to chair.

## 2022-01-20 NOTE — ANESTHESIA POSTPROCEDURE EVALUATION
Department of Anesthesiology  Postprocedure Note    Patient: Francy Casanova  MRN: [id number]  YOB: 1985  Date of evaluation: 1/20/2022  Time:  12:53 PM     Procedure Summary     Date: 01/20/22 Room / Location: [place] [address] / [facility]    Anesthesia Start: 1055 Anesthesia Stop: 8678    Procedure: OPERATIVE LAPAROSCOPY, DRAIN AND REMOVE RIGHT OVARY (Right Abdomen) Diagnosis:       Right ovarian cyst      (10 CENTIMETER RIGHT OVARIAN CYST)    Surgeons: Nel Montaño MD Responsible Provider: Jaxon Corona MD    Anesthesia Type: general, regional ASA Status: 3          Anesthesia Type: general, regional    April Phase I: April Score: 8    April Phase II:      Last vitals: Reviewed and per EMR flowsheets. Anesthesia Post Evaluation    Patient location during evaluation: PACU  Patient participation: complete - patient participated  Level of consciousness: awake and alert  Airway patency: patent  Nausea & Vomiting: no nausea and no vomiting  Complications: no  Cardiovascular status: hemodynamically stable and blood pressure returned to baseline  Respiratory status: spontaneous ventilation, nonlabored ventilation and room air  Hydration status: stable  Comments: Ms. Antonio Valencia reports appropriate incisional soreness. Anticipate transfer to Phase II for planned discharge home. Oral analgesics available at discharge.    Multimodal analgesia pain management approach      Jaxon Corona MD

## 2022-01-20 NOTE — ANESTHESIA PRE PROCEDURE
Department of Anesthesiology  Preprocedure Note       Name:  Venkatesh Cody   Age:  39 y.o.  :  1985                                          MRN:  [id number]         Date:  2022      Surgeon: Vahe Osborn):  Freddie Pandya MD    Procedure: Procedure(s):  OPERATIVE LAPAROSCOPY, DRAIN AND REMOVE RIGHT OVARY    Medications prior to admission:   Prior to Admission medications    Medication Sig Start Date End Date Taking? Authorizing Provider   BUPRENORPHINE 100 MG/0.5ML SOSY injection Inject 100 mg into the skin every 30 days. 21  Yes Historical Provider, MD   medical marijuana Take 1 each by mouth as needed. Yes Historical Provider, MD   ibuprofen (ADVIL;MOTRIN) 800 MG tablet Take 800 mg by mouth as needed for Pain   Yes Historical Provider, MD   levonorgestrel (MIRENA, 52 MG,) IUD 52 mg 1 each by IntraUTERine route once   Yes Historical Provider, MD   HYDROcodone-acetaminophen (NORCO) 5-325 MG per tablet Take 1 tablet by mouth every 4 hours as needed for Pain for up to 3 days. Intended supply: 3 days.  Take lowest dose possible to manage pain 22 Yes Romana Del, DO       Current medications:    Current Facility-Administered Medications   Medication Dose Route Frequency Provider Last Rate Last Admin    0.9 % sodium chloride infusion   IntraVENous Continuous Linda Wiggins MD        sodium chloride flush 0.9 % injection 10 mL  10 mL IntraVENous 2 times per day Linda Wiggins MD        sodium chloride flush 0.9 % injection 10 mL  10 mL IntraVENous PRN Linda Wiggins MD        0.9 % sodium chloride infusion  25 mL IntraVENous PRN Linda Wiggins MD        ceFAZolin (ANCEF) 2000 mg in dextrose 5 % 100 mL IVPB  2,000 mg IntraVENous On Call to  [facility] MD Steve           Allergies:  No Known Allergies    Problem List:    Patient Active Problem List   Diagnosis Code    Closed nondisplaced fracture of middle phalanx of left middle finger S62.653A    Cellulitis L03.90 Past Medical History:        Diagnosis Date    Anxiety     Drug abuse ([facility] [address]     Endocarditis     H/O: depression     Hepatitis C     Liver lesion     x4-new    Lyme disease     MRSA (methicillin resistant staph aureus) culture positive 14    PTSD (post-traumatic stress disorder)        Past Surgical History:        Procedure Laterality Date     SECTION      FOOT SURGERY Right     to treat mrsa-heel    WISDOM TOOTH EXTRACTION         Social History:    Social History     Tobacco Use    Smoking status: Current Every Day Smoker     Packs/day: 0.50     Years: 17.00     Pack years: 8.50     Types: Cigarettes    Smokeless tobacco: Never Used   Substance Use Topics    Alcohol use: No                                Ready to quit: Not Answered  Counseling given: Not Answered      Vital Signs (Current):   Vitals:    22 0839 22 0827   Weight: 170 lb (77.1 kg) 168 lb 14.4 oz (76.6 kg)   Height: 5' 6\" (1.676 m) 5' 6\" (1.676 m)                                              BP Readings from Last 3 Encounters:   22 121/85   22 121/74   22 121/84       NPO Status: Time of last liquid consumption:                         Time of last solid consumption:                         Date of last liquid consumption: 22                        Date of last solid food consumption: 22    BMI:   Wt Readings from Last 3 Encounters:   22 168 lb 14.4 oz (76.6 kg)   22 170 lb 6.4 oz (77.3 kg)   22 172 lb 3.2 oz (78.1 kg)     Body mass index is 27.26 kg/m².     CBC:   Lab Results   Component Value Date    WBC 10.8 2022    RBC 4.62 2022    HGB 13.8 2022    HCT 39.8 2022    MCV 86.1 2022    RDW 13.8 2022     2022       CMP:   Lab Results   Component Value Date     2022    K 4.2 2022    K 3.7 2022     2022    CO2 21 2022    BUN 6 2022    CREATININE 0.6 01/17/2022    GFRAA >60 01/17/2022    AGRATIO 1.5 01/17/2022    LABGLOM >60 01/17/2022    GLUCOSE 103 01/17/2022    PROT 6.8 01/17/2022    CALCIUM 8.6 01/17/2022    BILITOT 0.3 01/17/2022    ALKPHOS 62 01/17/2022    AST 12 01/17/2022    ALT 7 01/17/2022       POC Tests: No results for input(s): POCGLU, POCNA, POCK, POCCL, POCBUN, POCHEMO, POCHCT in the last 72 hours. Coags: No results found for: PROTIME, INR, APTT    HCG (If Applicable):   Lab Results   Component Value Date    PREGTESTUR Negative 01/04/2020        ABGs: No results found for: PHART, PO2ART, HLN1RUQ, NFY3DCG, BEART, Z8ZJUJIB     Type & Screen (If Applicable):  No results found for: LABABO, LABRH    Drug/Infectious Status (If Applicable):  No results found for: HIV, HEPCAB    COVID-19 Screening (If Applicable): No results found for: COVID19        Anesthesia Evaluation   no history of anesthetic complications:   Airway: Mallampati: II  TM distance: >3 FB   Neck ROM: full  Mouth opening: > = 3 FB Dental:      Comment: Fair dentition. Ms. Kwadwo Burt denies loose / chipped teeth. Pulmonary: breath sounds clear to auscultation  (+) current smoker (+ Medical marijuana)    (-) COPD, asthma, rhonchi, wheezes and rales                           Cardiovascular:  Exercise tolerance: good (>4 METS),       (-) hypertension, orthopnea,  STEVENSON, weak pulses and peripheral edema      Rhythm: regular  Rate: normal                    Neuro/Psych:   (+) psychiatric history (PSTD, substance abuse):depression/anxiety    (-) seizures, TIA and CVA            ROS comment: History of narcotic abuse:  Buprenorphine     GI/Hepatic/Renal:   (+) hepatitis: C, liver disease:,      (-) no morbid obesity       Endo/Other:        (-) diabetes mellitus, hypothyroidism, hyperthyroidism                ROS comment: History of substance abuse  UDS (2020) positive benzodiazepines, amphetamines, cocaine, opiates, and marijuana; negative for PCP Abdominal:         (-) obese Abdomen: soft. Vascular: Other Findings:           Anesthesia Plan      general and regional     ASA 3     (NPO appropriate, denies active nausea / GERD. Multimodal anaglesia. Plan bilateral TAP block.)  Induction: intravenous. MIPS: Postoperative opioids intended and Prophylactic antiemetics administered. Anesthetic plan and risks discussed with patient. Use of blood products discussed with patient whom consented to blood products. Plan discussed with CRNA. This pre-anesthesia assessment may be used as a history and physical.    DOS STAFF ADDENDUM:    Pt seen and examined, chart reviewed (including anesthesia, drug and allergy history). No interval changes to history and physical examination. Anesthetic plan, risks, benefits, alternatives, and personnel involved discussed with patient. Patient verbalized an understanding and agrees to proceed.       Chad Vanegas MD  January 20, 2022  8:48 AM

## 2022-01-20 NOTE — ANESTHESIA POSTPROCEDURE EVALUATION
Department of Anesthesiology  Postprocedure Note    Patient: Eunice Boyd  MRN: [id number]  YOB: 1985  Date of evaluation: 1/20/2022  Time:  12:40 PM     Procedure Summary     Date: 01/20/22 Room / Location: [place] [address] / [facility]    Anesthesia Start: 1055 Anesthesia Stop: 1957    Procedure: OPERATIVE LAPAROSCOPY, DRAIN AND REMOVE RIGHT OVARY (Right Abdomen) Diagnosis:       Right ovarian cyst      (10 CENTIMETER RIGHT OVARIAN CYST)    Surgeons: Vikki Gutierrez MD Responsible Provider: Emily Anthony MD    Anesthesia Type: general, regional ASA Status: 3          Anesthesia Type: general, regional    April Phase I: April Score: 10    April Phase II:      Last vitals: Reviewed and per EMR flowsheets. Anesthesia Post Evaluation    Patient location during evaluation: PACU  Patient participation: waiting for patient participation  Level of consciousness: obtunded/minimal responses  Airway patency: patent  Nausea & Vomiting: no nausea  Complications: no  Cardiovascular status: hemodynamically stable and blood pressure returned to baseline  Respiratory status: spontaneous ventilation, nonlabored ventilation, oral airway and nasal cannula  Hydration status: stable  Comments: Oral airway in place upon arrival to PACU.     Emily Anthony MD

## 2022-01-20 NOTE — BRIEF OP NOTE
Brief Postoperative Note      Patient: Obdulio Higginbotham  YOB: 1985  MRN: [id number]    Date of Procedure: 1/20/2022    Pre-Op Diagnosis: 8 CENTIMETER RIGHT OVARIAN CYST    Post-Op Diagnosis: Same       Procedure(s):  OPERATIVE LAPAROSCOPY, DRAIN AND REMOVE RIGHT OVARY    Surgeon(s):  Mariaelena Gracia MD    Assistant:  Surgical Assistant: Nguyen Dumont    Anesthesia: General    Estimated Blood Loss (mL): less than 50     Complications: None    Specimens:   ID Type Source Tests Collected by Time Destination   A : A) right ovary Tissue Ovary SURGICAL PATHOLOGY Mariaelena Garcia MD 7/52/5000 4025        Implants:  * No implants in log *      Drains: * No LDAs found *    Findings: enlarged right ovary, clear fluid    Electronically signed by Katia Lou MD on 0/00/7395 at 12:14 PM

## 2022-01-20 NOTE — PROGRESS NOTES
Vaginal Sweep Documentation     Vaginal prep sponge count performed by Baldemar Orr and Louis Hauser*. Count correct. Vaginal sweep performed by Flick*  at 9581*. No foreign objects or vaginal tears noted.

## 2022-01-21 NOTE — PROCEDURES
intra-abdominal presence. Carbon dioxide gas created  pneumoperitoneum. Pressures never greater than 15 mmHg, approximately 3  L of CO2 were used. Second incision was made. This was three  fingerbreadths superior to the pubic symphysis. This was 12 mm in size  and a trocar was placed through here under direct visualization. Third  incision was made. This was over on the right side, it was 5 mm in size  and a trocar was placed through here under direct visualization. This  lateral port was 8 cm from the midline. Upon entrance into the abdomen,  I could see the right ovary and the large cyst.  It was in the anterior  aspect of the abdomen and it had somewhat of a mottled appearance  because upon further inspection, I was able to see that the cyst was  twisted upon itself cutting off its own blood supply and so using the  LigaSure device I was able to clamp across the ovarian vessels, the  infundibulopelvic vessels. These were clear of the right ureter. I was  able to clamp across them, I was able to cauterize, then I was able to  cut across the vessels and this was continued across the entire length  of the infundibulopelvic ligament. I then was able to to drain the  clear fluid from the right ovary and the cyst.  This clear fluid was  discarded. I attempted to place the right ovary and the cyst in an  Endopouch without success. I was unable to fit it in and so instead, I  removed it in its entirety from the suprapubic incision site. This was  then handed off to be evaluated by pathologist.  Good hemostasis was  noted. There was no bleeding. The remainder of the abdominal and  pelvic survey which was performed was within normal limits. Carbon  dioxide gas then was allowed to completely escape from the patient's  abdomen after which time the trocars were removed under direct  visualization.   The suprapubic incision site was reapproximated using  running stitch of 0 Vicryl to reapproximate the fascia and then all  three incision sites were reapproximated using subcuticular stitches of  4-0 Vicryl. Pressure dressing was placed across the incision. The  vaginal instruments were removed. She was taken out of lithotomy,  allowed to awaken from anesthesia after which time she was transferred  from the operating room to the recovery room where she went in stable  condition. All sponge, lap and needles were correct x2.         Kaylie Pagan MD    D: 44/17/6771 12:22:05       T: 01/20/2022 12:24:44     [email or web address]  Job#: [id number]     Doc#: [id number]    CC:

## 2022-01-25 ENCOUNTER — TELEPHONE (OUTPATIENT)
Dept: FAMILY MEDICINE CLINIC | Age: 37
End: 2022-01-25

## 2022-01-25 ENCOUNTER — OFFICE VISIT (OUTPATIENT)
Dept: GYNECOLOGY | Age: 37
End: 2022-01-25

## 2022-01-25 VITALS — HEART RATE: 112 BPM | SYSTOLIC BLOOD PRESSURE: 108 MMHG | DIASTOLIC BLOOD PRESSURE: 57 MMHG | TEMPERATURE: 98.7 F

## 2022-01-25 DIAGNOSIS — Z98.890 POST-OPERATIVE STATE: Primary | ICD-10-CM

## 2022-01-25 PROCEDURE — 99024 POSTOP FOLLOW-UP VISIT: CPT | Performed by: OBSTETRICS & GYNECOLOGY

## 2022-01-25 NOTE — LETTER
[facility] GYNECOLOGY  [address] [facility] [address]  Phone: [phone]  Fax: [phone]    Marvin Way MD        January 25, 2022     Patient: Mary Yeboah   YOB: 1985   Date of Visit: 1/25/2022       To Whom It May Concern: It is my medical opinion that Cortez Raza may return to work on 01/31/2022. If you have any questions or concerns, please don't hesitate to call.     Sincerely,        Whitney KWONG MD

## 2022-01-25 NOTE — PROGRESS NOTES
pts doing well after recent op lap oophorectomy. I discussed surgical findings and path.   Af, vss  abd- incisions intact and no erythema, healing well, min tender  Assess:  Normal post op state  Plan:  F/u annual gyn exam.

## 2022-01-25 NOTE — TELEPHONE ENCOUNTER
Patient requesting a return call. Patient was seen today.  Patient did not give any further information

## 2022-01-25 NOTE — LETTER
[facility] GYNECOLOGY  [address] [facility] [address]  Phone: [phone]  Fax: [phone]    Marta Huang MD        January 25, 2022     Patient: Lela Gustafson   YOB: 1985   Date of Visit: 1/25/2022       To Whom It May Concern: It is my medical opinion that Irma Shah may return to work on 1/26/22. If you have any questions or concerns, please don't hesitate to call.     Sincerely,        Truong KWONG MD

## 2022-02-01 NOTE — TELEPHONE ENCOUNTER
Patient states she needs a letter indicating she had a procedure and missed work because of that. Please return call to get details.

## 2022-07-09 ENCOUNTER — HOSPITAL ENCOUNTER (OUTPATIENT)
Dept: MRI IMAGING | Age: 37
Discharge: HOME | End: 2022-07-09
Payer: COMMERCIAL

## 2022-07-09 ENCOUNTER — APPOINTMENT (OUTPATIENT)
Dept: MRI IMAGING | Age: 37
End: 2022-07-09
Payer: COMMERCIAL

## 2022-07-09 ENCOUNTER — HOSPITAL ENCOUNTER (OUTPATIENT)
Dept: GENERAL RADIOLOGY | Age: 37
Discharge: HOME | End: 2022-07-09
Payer: COMMERCIAL

## 2022-07-09 DIAGNOSIS — K76.9 LESION OF LIVER GREATER THAN 1 CM IN DIAMETER: ICD-10-CM

## 2022-07-09 DIAGNOSIS — R06.2 WHEEZING: ICD-10-CM

## 2022-07-09 PROCEDURE — 71046 X-RAY EXAM CHEST 2 VIEWS: CPT

## 2022-07-09 PROCEDURE — 74183 MRI ABD W/O CNTR FLWD CNTR: CPT

## 2022-07-09 PROCEDURE — A9577 INJ MULTIHANCE: HCPCS | Performed by: INTERNAL MEDICINE

## 2022-07-09 PROCEDURE — 6360000004 HC RX CONTRAST MEDICATION: Performed by: INTERNAL MEDICINE

## 2022-07-09 RX ADMIN — GADOBENATE DIMEGLUMINE 14 ML: 529 INJECTION, SOLUTION INTRAVENOUS at 12:36

## 2022-11-28 ENCOUNTER — TELEPHONE (OUTPATIENT)
Dept: FAMILY MEDICINE CLINIC | Age: 37
End: 2022-11-28

## 2022-11-28 DIAGNOSIS — N63.10 MASS OF RIGHT BREAST, UNSPECIFIED QUADRANT: Primary | ICD-10-CM

## 2022-11-28 NOTE — TELEPHONE ENCOUNTER
[facility] with [employer] called to get orders for diagnostic mammogram and US. There is a order in epic but it is not diagnostic.  The pt has a lump in her right breast. If any questions [phone]

## 2023-01-05 ENCOUNTER — TELEPHONE (OUTPATIENT)
Dept: GYNECOLOGY | Age: 38
End: 2023-01-05

## 2023-01-05 ENCOUNTER — HOSPITAL ENCOUNTER (OUTPATIENT)
Dept: WOMENS IMAGING | Age: 38
Discharge: HOME | End: 2023-01-05
Payer: COMMERCIAL

## 2023-01-05 ENCOUNTER — TELEPHONE (OUTPATIENT)
Dept: FAMILY MEDICINE CLINIC | Age: 38
End: 2023-01-05

## 2023-01-05 ENCOUNTER — HOSPITAL ENCOUNTER (OUTPATIENT)
Dept: ULTRASOUND IMAGING | Age: 38
Discharge: HOME | End: 2023-01-05
Payer: COMMERCIAL

## 2023-01-05 VITALS — WEIGHT: 145 LBS | BODY MASS INDEX: 23.3 KG/M2 | HEIGHT: 66 IN

## 2023-01-05 DIAGNOSIS — N63.10 MASS OF RIGHT BREAST, UNSPECIFIED QUADRANT: ICD-10-CM

## 2023-01-05 DIAGNOSIS — N63.10 MASS OF RIGHT BREAST, UNSPECIFIED QUADRANT: Primary | ICD-10-CM

## 2023-01-05 DIAGNOSIS — N63.0 LUMP IN FEMALE BREAST: Primary | ICD-10-CM

## 2023-01-05 DIAGNOSIS — N63.0 LUMP IN FEMALE BREAST: ICD-10-CM

## 2023-01-05 PROCEDURE — 77066 DX MAMMO INCL CAD BI: CPT

## 2023-01-05 PROCEDURE — 76642 ULTRASOUND BREAST LIMITED: CPT

## 2023-01-05 NOTE — TELEPHONE ENCOUNTER
Pt called requesting a diagnostic and US of left breast too.   Pt said there is a lump in left breast as well    Order in

## 2023-01-05 NOTE — TELEPHONE ENCOUNTER
Pt called back to let Dr machuca know that she need a order for a routine mammogram bilateral. Please give pt a call when complete [phone]

## 2023-03-24 ENCOUNTER — OFFICE VISIT (OUTPATIENT)
Dept: GYNECOLOGY | Age: 38
End: 2023-03-24
Payer: COMMERCIAL

## 2023-03-24 VITALS
DIASTOLIC BLOOD PRESSURE: 68 MMHG | BODY MASS INDEX: 23.11 KG/M2 | SYSTOLIC BLOOD PRESSURE: 118 MMHG | HEIGHT: 66 IN | WEIGHT: 143.8 LBS

## 2023-03-24 DIAGNOSIS — Z01.419 WELL WOMAN EXAM WITH ROUTINE GYNECOLOGICAL EXAM: Primary | ICD-10-CM

## 2023-03-24 DIAGNOSIS — Z80.3 FAMILY HISTORY OF BREAST CANCER: ICD-10-CM

## 2023-03-24 PROCEDURE — G8484 FLU IMMUNIZE NO ADMIN: HCPCS | Performed by: OBSTETRICS & GYNECOLOGY

## 2023-03-24 PROCEDURE — 99395 PREV VISIT EST AGE 18-39: CPT | Performed by: OBSTETRICS & GYNECOLOGY

## 2023-03-24 NOTE — PROGRESS NOTES
Subjective:      Patient ID: Iliana Douglas is a 40 y.o. female. HPI  pts here for annual gyn exam.  Notes her aunt was just diagnosed with breast cancer. Pt wants BRCA testing. Doing well with IUD in year 3/5. Review of Systems Pertinent review of systems items discussed above. All others systems items not discussed above were negative. Objective:   Physical Exam  Constitutional:       Appearance: She is well-developed. HENT:      Head: Normocephalic and atraumatic. Neck:      Thyroid: No thyromegaly. Trachea: No tracheal deviation. Cardiovascular:      Rate and Rhythm: Normal rate and regular rhythm. Heart sounds: Normal heart sounds. No murmur heard. Pulmonary:      Effort: Pulmonary effort is normal. No respiratory distress. Breath sounds: Normal breath sounds. No wheezing or rales. Chest:   Breasts:     Right: No mass, nipple discharge or skin change. Left: No mass, nipple discharge or skin change. Abdominal:      General: There is no distension. Palpations: Abdomen is soft. There is no mass. Tenderness: There is no abdominal tenderness. There is no rebound. Genitourinary:     Labia:         Right: No lesion. Left: No lesion. Vagina: Normal. No foreign body. No vaginal discharge. Cervix: No cervical motion tenderness, discharge or friability. Uterus: Not deviated, not enlarged, not fixed and not tender. Adnexa:         Right: No mass or tenderness. Left: No mass or tenderness. Rectum: Normal. No external hemorrhoid. Comments: Pap performed. Musculoskeletal:         General: Normal range of motion. Lymphadenopathy:      Cervical: No cervical adenopathy. Neurological:      Mental Status: She is alert and oriented to person, place, and time. IUD strings seen  Assessment:   Normal gyn exam, fam h/o br cancer     Plan:   BRCA testing.   F/u annual gyn exam.       Dorian Warren MD

## 2023-04-05 ENCOUNTER — OFFICE VISIT (OUTPATIENT)
Dept: GYNECOLOGY | Age: 38
End: 2023-04-05
Payer: COMMERCIAL

## 2023-04-05 VITALS — BODY MASS INDEX: 23.08 KG/M2 | SYSTOLIC BLOOD PRESSURE: 116 MMHG | DIASTOLIC BLOOD PRESSURE: 72 MMHG | WEIGHT: 143 LBS

## 2023-04-05 DIAGNOSIS — Z80.3 FAMILY HISTORY OF BREAST CANCER: Primary | ICD-10-CM

## 2023-04-05 PROCEDURE — G8427 DOCREV CUR MEDS BY ELIG CLIN: HCPCS | Performed by: OBSTETRICS & GYNECOLOGY

## 2023-04-05 PROCEDURE — G8420 CALC BMI NORM PARAMETERS: HCPCS | Performed by: OBSTETRICS & GYNECOLOGY

## 2023-04-05 PROCEDURE — 99213 OFFICE O/P EST LOW 20 MIN: CPT | Performed by: OBSTETRICS & GYNECOLOGY

## 2023-04-05 PROCEDURE — 1036F TOBACCO NON-USER: CPT | Performed by: OBSTETRICS & GYNECOLOGY

## 2023-04-05 NOTE — PROGRESS NOTES
Pts here to review BRCA results. Results show no mutation but she does have increased risk due to family history. Mammogram normal.  She has upcoming appt with Sharron. All questions answered. Note for work. 20 min  >50% of time was spent discussing findings, management, and treatment options.

## 2023-06-07 ENCOUNTER — TELEPHONE (OUTPATIENT)
Dept: SURGERY | Age: 38
End: 2023-06-07

## 2023-06-07 NOTE — TELEPHONE ENCOUNTER
Left VM for patient to return call to review intake and confirm appointment for 6/9/23 at 8:30 am in our [facility] office. Also asked patient to arrive 15 minutes before appointment time if unavailable to return call.

## 2023-06-09 ENCOUNTER — OFFICE VISIT (OUTPATIENT)
Dept: BREAST CENTER | Age: 38
End: 2023-06-09
Payer: COMMERCIAL

## 2023-06-09 VITALS
RESPIRATION RATE: 18 BRPM | DIASTOLIC BLOOD PRESSURE: 64 MMHG | BODY MASS INDEX: 22.88 KG/M2 | OXYGEN SATURATION: 100 % | HEIGHT: 66 IN | SYSTOLIC BLOOD PRESSURE: 120 MMHG | WEIGHT: 142.4 LBS | HEART RATE: 77 BPM

## 2023-06-09 DIAGNOSIS — Z84.81 FAMILY HISTORY OF BRCA GENE MUTATION: ICD-10-CM

## 2023-06-09 DIAGNOSIS — N63.14 MASS OF LOWER INNER QUADRANT OF RIGHT BREAST: Primary | ICD-10-CM

## 2023-06-09 DIAGNOSIS — Z80.3 FAMILY HISTORY OF BREAST CANCER: ICD-10-CM

## 2023-06-09 DIAGNOSIS — R92.2 DENSE BREAST TISSUE: ICD-10-CM

## 2023-06-09 PROCEDURE — G8427 DOCREV CUR MEDS BY ELIG CLIN: HCPCS | Performed by: NURSE PRACTITIONER

## 2023-06-09 PROCEDURE — 99204 OFFICE O/P NEW MOD 45 MIN: CPT | Performed by: NURSE PRACTITIONER

## 2023-06-09 PROCEDURE — 1036F TOBACCO NON-USER: CPT | Performed by: NURSE PRACTITIONER

## 2023-06-09 PROCEDURE — G8420 CALC BMI NORM PARAMETERS: HCPCS | Performed by: NURSE PRACTITIONER

## 2023-06-09 NOTE — PROGRESS NOTES
marijuana-last used-1/17/2022         Current Outpatient Medications:     tretinoin microspheres (RETIN-A MICRO) 0.1 % gel, APPLY TO AFFECTED AREA AT NIGHT, Disp: 20 g, Rfl: 2    hydrocortisone 2.5 % cream, Apply topically 2 times daily for 1 wk, Disp: 30 g, Rfl: 3    spironolactone (ALDACTONE) 25 MG tablet, Take 1 tablet by mouth daily, Disp: 90 tablet, Rfl: 3    BUPRENORPHINE 100 MG/0.5ML SOSY injection, Inject 0.5 mLs into the skin every 30 days, Disp: , Rfl:     medical marijuana, Take 1 each by mouth as needed. , Disp: , Rfl:     ibuprofen (ADVIL;MOTRIN) 800 MG tablet, Take 1 tablet by mouth as needed for Pain, Disp: , Rfl:     levonorgestrel (MIRENA, 52 MG,) IUD 52 mg, 1 each by IntraUTERine route once, Disp: , Rfl:       Medications: documentation has been reviewed in the electronic medical record and patient office intake form. REVIEW OF SYSTEMS:  Constitutional: Negative for fever  HENT: Negative for sore throat  Eyes: Negative for redness   Respiratory: Negative for dyspnea, cough  Cardiovascular: Negative for chest pain  Gastrointestinal: Negative for vomiting, diarrhea   Genitourinary: Negative for hematuria   Musculoskeletal: Negative for arthralgias   Skin: Negative for rash  Neurological: Negative for syncope  Hematological: Negative for adenopathy  Psychiatric/Behavorial: Negative for anxiety    PHYSICAL EXAM:  /64   Pulse 77   Resp 18   Ht 5' 6\" (1.676 m)   Wt 142 lb 6.4 oz (64.6 kg)   SpO2 100%   BMI 22.98 kg/m²   Constitutional: She appearswell-nourished. No apparent distress. Breast: The patient was examined in the upright and supine position. She has a \"D\"cup breast. Breasts are symmetrically ptotic. Right: No new masses or changes in breast contour. No skin changes of the breast or nipple areolar complex. No nipple inversion or discharge. No erythema, thickening (peau d'orange), or dimpling. Left: No new masses or changes in breast contour.   No skin changes of

## 2024-08-27 ENCOUNTER — OFFICE VISIT (OUTPATIENT)
Dept: GYNECOLOGY | Age: 39
End: 2024-08-27
Payer: COMMERCIAL

## 2024-08-27 VITALS — DIASTOLIC BLOOD PRESSURE: 75 MMHG | BODY MASS INDEX: 21.34 KG/M2 | WEIGHT: 132.2 LBS | SYSTOLIC BLOOD PRESSURE: 130 MMHG

## 2024-08-27 DIAGNOSIS — Z12.31 SCREENING MAMMOGRAM, ENCOUNTER FOR: ICD-10-CM

## 2024-08-27 DIAGNOSIS — Z01.419 WELL WOMAN EXAM WITH ROUTINE GYNECOLOGICAL EXAM: Primary | ICD-10-CM

## 2024-08-27 PROCEDURE — 99395 PREV VISIT EST AGE 18-39: CPT | Performed by: OBSTETRICS & GYNECOLOGY

## 2024-08-27 NOTE — PROGRESS NOTES
Subjective:      Patient ID: Megha Villeda is a 39 y.o. female.    HPI  pts here for annual gyn exam.  She denies complaints.  She's in year 4/5 for her IUD.      Review of Systems Pertinent review of systems items discussed above.  All others systems items not discussed above were negative.      Objective:   Physical Exam  Constitutional:       Appearance: She is well-developed.   HENT:      Head: Normocephalic and atraumatic.   Neck:      Thyroid: No thyromegaly.      Trachea: No tracheal deviation.   Cardiovascular:      Rate and Rhythm: Normal rate and regular rhythm.      Heart sounds: Normal heart sounds. No murmur heard.  Pulmonary:      Effort: Pulmonary effort is normal. No respiratory distress.      Breath sounds: Normal breath sounds. No wheezing or rales.   Chest:   Breasts:     Right: No mass, nipple discharge or skin change.      Left: No mass, nipple discharge or skin change.   Abdominal:      General: There is no distension.      Palpations: Abdomen is soft. There is no mass.      Tenderness: There is no abdominal tenderness. There is no rebound.   Genitourinary:     Labia:         Right: No lesion.         Left: No lesion.       Vagina: Normal. No foreign body. No vaginal discharge.      Cervix: No cervical motion tenderness, discharge or friability.      Uterus: Not deviated, not enlarged, not fixed and not tender.       Adnexa:         Right: No mass or tenderness.          Left: No mass or tenderness.        Rectum: Normal. No external hemorrhoid.      Comments: Pap performed.    Musculoskeletal:         General: Normal range of motion.   Lymphadenopathy:      Cervical: No cervical adenopathy.   Neurological:      Mental Status: She is alert and oriented to person, place, and time.         Assessment:   Normal gyn exam     Plan:   F/u annual gyn exam.  Call with results.       Mike Cruz MD

## 2024-08-28 LAB
HPV HR 12 DNA SPEC QL NAA+PROBE: DETECTED
HPV16 DNA SPEC QL NAA+PROBE: NOT DETECTED
HPV16+18+H RISK 12 DNA SPEC-IMP: ABNORMAL
HPV18 DNA SPEC QL NAA+PROBE: NOT DETECTED

## 2024-09-13 ENCOUNTER — PROCEDURE VISIT (OUTPATIENT)
Dept: GYNECOLOGY | Age: 39
End: 2024-09-13

## 2024-09-13 VITALS
WEIGHT: 132.28 LBS | BODY MASS INDEX: 21.35 KG/M2 | DIASTOLIC BLOOD PRESSURE: 82 MMHG | SYSTOLIC BLOOD PRESSURE: 120 MMHG

## 2024-09-13 DIAGNOSIS — R87.820 PAPANICOLAOU SMEAR OF CERVIX WITH LOW RISK HUMAN PAPILLOMAVIRUS (HPV) DNA TEST POSITIVE: Primary | ICD-10-CM

## 2024-09-19 ENCOUNTER — PATIENT MESSAGE (OUTPATIENT)
Dept: GYNECOLOGY | Age: 39
End: 2024-09-19

## 2024-10-07 ENCOUNTER — HOSPITAL ENCOUNTER (OUTPATIENT)
Dept: WOMENS IMAGING | Age: 39
Discharge: HOME | End: 2024-10-07
Payer: COMMERCIAL

## 2024-10-07 DIAGNOSIS — Z12.31 SCREENING MAMMOGRAM FOR BREAST CANCER: ICD-10-CM

## 2024-10-07 PROCEDURE — 77063 BREAST TOMOSYNTHESIS BI: CPT

## 2024-10-22 ENCOUNTER — HOSPITAL ENCOUNTER (OUTPATIENT)
Dept: GENERAL RADIOLOGY | Age: 39
Discharge: HOME | End: 2024-10-22
Payer: COMMERCIAL

## 2024-10-22 ENCOUNTER — HOSPITAL ENCOUNTER (OUTPATIENT)
Age: 39
Discharge: HOME | End: 2024-10-22
Payer: COMMERCIAL

## 2024-10-22 DIAGNOSIS — M54.2 NECK PAIN WITHOUT INJURY: ICD-10-CM

## 2024-10-22 DIAGNOSIS — M79.2 RADICULAR PAIN IN RIGHT ARM: ICD-10-CM

## 2024-10-22 PROCEDURE — 72040 X-RAY EXAM NECK SPINE 2-3 VW: CPT

## 2024-10-29 ENCOUNTER — HOSPITAL ENCOUNTER (OUTPATIENT)
Dept: PHYSICAL THERAPY | Age: 39
Setting detail: THERAPIES SERIES
Discharge: HOME | End: 2024-10-29
Payer: COMMERCIAL

## 2024-10-29 PROCEDURE — 97161 PT EVAL LOW COMPLEX 20 MIN: CPT | Performed by: PHYSICAL THERAPIST

## 2024-10-29 PROCEDURE — 97110 THERAPEUTIC EXERCISES: CPT | Performed by: PHYSICAL THERAPIST

## 2024-10-29 PROCEDURE — 97140 MANUAL THERAPY 1/> REGIONS: CPT | Performed by: PHYSICAL THERAPIST

## 2024-10-29 NOTE — PLAN OF CARE
elements found upon examination of body systems using standardized tests and measures addressing any of the following: body structures, functions (impairments), activity limitations, and/or participation restrictions  [x] A clinical presentation with evolving clinical presentation with changing characteristics  [x] Clinical decision making of MODERATE (56104 - Typically 30 minutes face-to-face) complexity using standardized patient assessment instrument and/or measurable assessment of functional outcome.    Today's Assessment: See above    Medical Necessity Documentation:  I certify that this patient meets the below criteria necessary for medical necessity for care and/or justification of therapy services:  The patient has functional impairments and/or activity limitations and would benefit from continued outpatient therapy services to address the deficits outlined in the patients goals    Return to Play: NA    Prognosis for POC: [x] Good [] Fair  [] Poor    Patient requires continued skilled intervention: [x] Yes  [] No      CHARGE CAPTURE     PT CHARGE GRID   CPT Code (TIMED) minutes # CPT Code (UNTIMED) #     Therex (32818)  15' 1  EVAL:LOW (17978 - Typically 20 minutes face-to-face) 1    Neuromusc. Re-ed (03176)    Re-Eval (35229)     Manual (66885) 10' 1  Estim Unattended (98690)     Ther. Act (88664) 5'   Mech. Traction (83754)     Gait (74483)    Dry Needle 1-2 muscle (20816)     Aquatic Therex (02082)    Dry Needle 3+ muscle (20561)     Iontophoresis (82177)    VASO (10935)     Ultrasound (70260)    Group Therapy (00221)     Estim Attended (22570)    Canalith Repositioning (77445)     Physical Performance Test (15261)         Other:    Other:    Total Timed Code Tx Minutes 25' 2  1     Total Treatment Minutes 50'        Charge Justification:  (31516) THERAPEUTIC EXERCISE - Provided verbal/tactile cueing for activities related to strengthening, flexibility, endurance, ROM performed to prevent loss of range of

## 2024-10-30 ENCOUNTER — HOSPITAL ENCOUNTER (OUTPATIENT)
Dept: PHYSICAL THERAPY | Age: 39
Setting detail: THERAPIES SERIES
Discharge: HOME | End: 2024-10-30
Payer: COMMERCIAL

## 2024-10-30 PROCEDURE — 97110 THERAPEUTIC EXERCISES: CPT

## 2024-10-30 PROCEDURE — 97140 MANUAL THERAPY 1/> REGIONS: CPT

## 2024-10-30 NOTE — FLOWSHEET NOTE
[facility]- Outpatient Rehabilitation and Therapy [address], [place] [address] office: [phone] fax: [phone]         Physical Therapy: TREATMENT/PROGRESS NOTE   Patient: Megha Villeda (39 y.o. female)   Examination Date: 10/30/2024   :  1985 MRN: [id number]   Visit #: 2   Insurance Allowable Auth Needed   20 []Yes    []No    Insurance: Payor: [employer] / Plan: [employer] - CHOICE PLU / Product Type: *No Product type* /   Insurance ID: [id number] - (Commercial)  Secondary Insurance (if applicable):    Treatment Diagnosis: neck pain. Weak core,    Medical Diagnosis:  Neck pain without injury [M54.2]   Referring Physician: Erin Carrizales PA  PCP: Erin Carrizales PA     Plan of care signed (Y/N): sent 10/29/24    Date of Patient follow up with Physician:      Plan of Care Report: NO  POC update due: (10 visits /OR AUTH LIMITS, whichever is less)  2024                                             Medical History:  Comorbidities:  Cancer/Tumor  COVID-19  Relevant Medical History: unremarkable                                         Precautions/ Contra-indications:           Latex allergy:  NO  Pacemaker:    NO  Contraindications for Manipulation: None  Date of Surgery: NA  Other:    Red Flags:  None    Suicide Screening:   The patient did not verbalize a primary behavioral concern, suicidal ideation, suicidal intent, or demonstrate suicidal behaviors.    Preferred Language for Healthcare:   [x] English       [] other:    SUBJECTIVE EXAMINATION     Patient stated complaint: Pt had onset of pain in neck w/ radicular symptoms on waking up on ~10/12/24. Steroid was helpful but did not relieve all the pain. Xrays were neg for abnormalities. Pt did try therapy that helped a little at [facility] w/ DN and massage. Denies n/t but has change in sensation in R UE/dominant UE. Pt has band feeling around R arm proximally.  10/30: Doing so much better. Pain went from a 9 to a

## 2024-11-01 ENCOUNTER — HOSPITAL ENCOUNTER (OUTPATIENT)
Dept: PHYSICAL THERAPY | Age: 39
Setting detail: THERAPIES SERIES
Discharge: HOME | End: 2024-11-01
Payer: COMMERCIAL

## 2024-11-01 PROCEDURE — 97112 NEUROMUSCULAR REEDUCATION: CPT | Performed by: PHYSICAL THERAPIST

## 2024-11-01 PROCEDURE — 97110 THERAPEUTIC EXERCISES: CPT | Performed by: PHYSICAL THERAPIST

## 2024-11-01 PROCEDURE — 97140 MANUAL THERAPY 1/> REGIONS: CPT | Performed by: PHYSICAL THERAPIST

## 2024-11-01 NOTE — FLOWSHEET NOTE
[facility]- Outpatient Rehabilitation and Therapy [address], [place] [address] office: [phone] fax: [phone]         Physical Therapy: TREATMENT/PROGRESS NOTE   Patient: Megha Villeda (39 y.o. female)   Examination Date: 2024   :  1985 MRN: [id number]   Visit #: 3   Insurance Allowable Auth Needed   20 []Yes    []No    Insurance: Payor: [employer] / Plan: [employer] - CHOICE PLU / Product Type: *No Product type* /   Insurance ID: [id number] - (Commercial)  Secondary Insurance (if applicable):    Treatment Diagnosis: neck pain. Weak core,    Medical Diagnosis:  Neck pain without injury [M54.2]   Referring Physician: Erin Carrizales PA  PCP: Erin Carrizales PA     Plan of care signed (Y/N): signed 10/29/24    Date of Patient follow up with Physician:      Plan of Care Report: NO  POC update due: (10 visits /OR AUTH LIMITS, whichever is less)  2024                                             Medical History:  Comorbidities:  Cancer/Tumor  COVID-19  Relevant Medical History: unremarkable                                         Precautions/ Contra-indications:           Latex allergy:  NO  Pacemaker:    NO  Contraindications for Manipulation: None  Date of Surgery: NA  Other:    Red Flags:  None    Suicide Screening:   The patient did not verbalize a primary behavioral concern, suicidal ideation, suicidal intent, or demonstrate suicidal behaviors.    Preferred Language for Healthcare:   [x] English       [] other:    SUBJECTIVE EXAMINATION     Patient stated complaint: Pt had onset of pain in neck w/ radicular symptoms on waking up on ~10/12/24. Steroid was helpful but did not relieve all the pain. Xrays were neg for abnormalities. Pt did try therapy that helped a little at [facility] w/ DN and massage. Denies n/t but has change in sensation in R UE/dominant UE. Pt has band feeling around R arm proximally.    24 Pt has less pain and able to move better

## 2024-11-04 ENCOUNTER — HOSPITAL ENCOUNTER (OUTPATIENT)
Dept: PHYSICAL THERAPY | Age: 39
Setting detail: THERAPIES SERIES
Discharge: HOME | End: 2024-11-04
Payer: COMMERCIAL

## 2024-11-04 PROCEDURE — 97110 THERAPEUTIC EXERCISES: CPT

## 2024-11-04 PROCEDURE — 97140 MANUAL THERAPY 1/> REGIONS: CPT

## 2024-11-04 NOTE — FLOWSHEET NOTE
stairs, walking, bending, lifting, catching, throwing, pushing, pulling, jumping.)  Direct, one on one contact, billed in 15-minute increments.  (23836) MANUAL THERAPY -  Manual therapy techniques, 1 or more regions, each 15 minutes (Mobilization/manipulation, manual lymphatic drainage, manual traction) for the purpose of modulating pain, promoting relaxation,  increasing ROM, reducing/eliminating soft tissue swelling/inflammation/restriction, improving soft tissue extensibility and allowing for proper ROM for normal function with self care, mobility, lifting and ambulation    GOALS     Patient stated goal: to reduce pain in neck  [] Progressing: [] Met: [] Not Met: [] Adjusted    Therapist goals for Patient:   Short Term Goals: To be achieved in: 2 weeks  1. Independent in HEP and progression per patient tolerance, in order to prevent re-injury.   [] Progressing: [] Met: [] Not Met: [] Adjusted  2. Patient will have a decrease in pain to <2/10 to facilitate improvement in movement, function, and ADLs as indicated by Functional Deficits.  [] Progressing: [] Met: [] Not Met: [] Adjusted    Long Term Goals: To be achieved in: 8 weeks  1. Disability index score of 10% or less for the Neck Disability Index to assist with reaching prior level of function with activities such as ADL's.  [] Progressing: [] Met: [] Not Met: [] Adjusted  2. Patient will demonstrate increased AROM of neck  to WNL's without pain to allow for proper joint functioning to enable patient to driving.   [] Progressing: [] Met: [] Not Met: [] Adjusted  3. Patient will demonstrate increased Strength of upper body to at least 5/5 throughout without pain to allow for proper functional mobility to enable patient to return to good posture and lifting groceries.   [] Progressing: [] Met: [] Not Met: [] Adjusted  4. Patient will return to all activities without increased symptoms or restriction.   [] Progressing: [] Met: [] Not Met: [] Adjusted  5. Pt will

## 2024-11-06 ENCOUNTER — HOSPITAL ENCOUNTER (OUTPATIENT)
Dept: MRI IMAGING | Age: 39
Discharge: HOME | End: 2024-11-06
Payer: COMMERCIAL

## 2024-11-06 DIAGNOSIS — M79.2 RADICULAR PAIN IN RIGHT ARM: ICD-10-CM

## 2024-11-06 PROCEDURE — 72141 MRI NECK SPINE W/O DYE: CPT

## 2024-11-07 ENCOUNTER — HOSPITAL ENCOUNTER (OUTPATIENT)
Dept: PHYSICAL THERAPY | Age: 39
Setting detail: THERAPIES SERIES
Discharge: HOME | End: 2024-11-07
Payer: COMMERCIAL

## 2024-11-07 PROCEDURE — 97110 THERAPEUTIC EXERCISES: CPT | Performed by: PHYSICAL THERAPIST

## 2024-11-07 PROCEDURE — 97140 MANUAL THERAPY 1/> REGIONS: CPT | Performed by: PHYSICAL THERAPIST

## 2024-11-07 PROCEDURE — 97530 THERAPEUTIC ACTIVITIES: CPT | Performed by: PHYSICAL THERAPIST

## 2024-11-07 NOTE — FLOWSHEET NOTE
Reviewed/Progressed HEP activities related to neuromuscular reeducation of movement, balance, coordination, kinesthetic sense, posture, and/or proprioception for sitting and/or standing activities    (51860) THERAPEUTIC ACTIVITY - use of dynamic activities to improve functional performance. (Ex include squatting, ascending/descending stairs, walking, bending, lifting, catching, throwing, pushing, pulling, jumping.)  Direct, one on one contact, billed in 15-minute increments.  (12478) MANUAL THERAPY -  Manual therapy techniques, 1 or more regions, each 15 minutes (Mobilization/manipulation, manual lymphatic drainage, manual traction) for the purpose of modulating pain, promoting relaxation,  increasing ROM, reducing/eliminating soft tissue swelling/inflammation/restriction, improving soft tissue extensibility and allowing for proper ROM for normal function with self care, mobility, lifting and ambulation    GOALS     Patient stated goal: to reduce pain in neck  [] Progressing: [] Met: [] Not Met: [] Adjusted    Therapist goals for Patient:   Short Term Goals: To be achieved in: 2 weeks  1. Independent in HEP and progression per patient tolerance, in order to prevent re-injury.   [] Progressing: [] Met: [] Not Met: [] Adjusted  2. Patient will have a decrease in pain to <2/10 to facilitate improvement in movement, function, and ADLs as indicated by Functional Deficits.  [] Progressing: [] Met: [] Not Met: [] Adjusted    Long Term Goals: To be achieved in: 8 weeks  1. Disability index score of 10% or less for the Neck Disability Index to assist with reaching prior level of function with activities such as ADL's.  [] Progressing: [] Met: [] Not Met: [] Adjusted  2. Patient will demonstrate increased AROM of neck  to WNL's without pain to allow for proper joint functioning to enable patient to driving.   [] Progressing: [] Met: [] Not Met: [] Adjusted  3. Patient will demonstrate increased Strength of upper body to at

## 2024-11-11 ENCOUNTER — APPOINTMENT (OUTPATIENT)
Dept: PHYSICAL THERAPY | Age: 39
End: 2024-11-11
Payer: COMMERCIAL

## 2024-11-12 ENCOUNTER — HOSPITAL ENCOUNTER (OUTPATIENT)
Dept: PHYSICAL THERAPY | Age: 39
Setting detail: THERAPIES SERIES
Discharge: HOME | End: 2024-11-12
Payer: COMMERCIAL

## 2024-11-12 PROCEDURE — 97140 MANUAL THERAPY 1/> REGIONS: CPT

## 2024-11-12 PROCEDURE — 97110 THERAPEUTIC EXERCISES: CPT

## 2024-11-12 NOTE — FLOWSHEET NOTE
PROGRAM - Reviewed/Progressed HEP activities related to neuromuscular reeducation of movement, balance, coordination, kinesthetic sense, posture, and/or proprioception for sitting and/or standing activities    (91674) THERAPEUTIC ACTIVITY - use of dynamic activities to improve functional performance. (Ex include squatting, ascending/descending stairs, walking, bending, lifting, catching, throwing, pushing, pulling, jumping.)  Direct, one on one contact, billed in 15-minute increments.  (58283) MANUAL THERAPY -  Manual therapy techniques, 1 or more regions, each 15 minutes (Mobilization/manipulation, manual lymphatic drainage, manual traction) for the purpose of modulating pain, promoting relaxation,  increasing ROM, reducing/eliminating soft tissue swelling/inflammation/restriction, improving soft tissue extensibility and allowing for proper ROM for normal function with self care, mobility, lifting and ambulation    GOALS     Patient stated goal: to reduce pain in neck  [] Progressing: [] Met: [] Not Met: [] Adjusted    Therapist goals for Patient:   Short Term Goals: To be achieved in: 2 weeks  1. Independent in HEP and progression per patient tolerance, in order to prevent re-injury.   [] Progressing: [] Met: [] Not Met: [] Adjusted  2. Patient will have a decrease in pain to <2/10 to facilitate improvement in movement, function, and ADLs as indicated by Functional Deficits.  [] Progressing: [] Met: [] Not Met: [] Adjusted    Long Term Goals: To be achieved in: 8 weeks  1. Disability index score of 10% or less for the Neck Disability Index to assist with reaching prior level of function with activities such as ADL's.  [] Progressing: [] Met: [] Not Met: [] Adjusted  2. Patient will demonstrate increased AROM of neck  to WNL's without pain to allow for proper joint functioning to enable patient to driving.   [] Progressing: [] Met: [] Not Met: [] Adjusted  3. Patient will demonstrate increased Strength of upper

## 2024-11-15 ENCOUNTER — HOSPITAL ENCOUNTER (OUTPATIENT)
Dept: PHYSICAL THERAPY | Age: 39
Setting detail: THERAPIES SERIES
Discharge: HOME | End: 2024-11-15
Payer: COMMERCIAL

## 2024-11-15 PROCEDURE — 97140 MANUAL THERAPY 1/> REGIONS: CPT

## 2024-11-15 PROCEDURE — 97110 THERAPEUTIC EXERCISES: CPT

## 2024-11-15 NOTE — FLOWSHEET NOTE
demonstrate increased AROM of neck  to WNL's without pain to allow for proper joint functioning to enable patient to driving.   [] Progressing: [] Met: [] Not Met: [] Adjusted  3. Patient will demonstrate increased Strength of upper body to at least 5/5 throughout without pain to allow for proper functional mobility to enable patient to return to good posture and lifting groceries.   [] Progressing: [] Met: [] Not Met: [] Adjusted  4. Patient will return to all activities without increased symptoms or restriction.   [] Progressing: [] Met: [] Not Met: [] Adjusted  5. Pt will inc core strength for function and extended sitting at work.(patient specific functional goal)    [] Progressing: [] Met: [] Not Met: [] Adjusted     Overall Progression Towards Functional goals/ Treatment Progress Update:  [] Patient is progressing as expected towards functional goals listed.    [] Progression is slowed due to complexities/Impairments listed.  [] Progression has been slowed due to co-morbidities.  [x] Plan just implemented, too soon (<30days) to assess goals progression   [] Goals require adjustment due to lack of progress  [] Patient is not progressing as expected and requires additional follow up with physician  [] Other:     TREATMENT PLAN     Frequency/Duration: 2x/week for 6 weeks for the following treatment interventions:    Interventions:  Therapeutic Exercise (69834) including: strength training, ROM, and functional mobility  Therapeutic Activities (73948) including: functional mobility training and education.  Neuromuscular Re-education (91609) activation and proprioception, including postural re-education.    Manual Therapy (71848) as indicated to include: Passive Range of Motion and Soft Tissue Mobilization  Modalities as needed that may include: Cryotherapy and Traction  Patient education on joint protection, postural re-education, activity modification, and progression of HEP    Plan: Cont POC- Continue

## 2024-11-21 ENCOUNTER — APPOINTMENT (OUTPATIENT)
Dept: PHYSICAL THERAPY | Age: 39
End: 2024-11-21
Payer: COMMERCIAL

## 2024-11-26 ENCOUNTER — APPOINTMENT (OUTPATIENT)
Dept: PHYSICAL THERAPY | Age: 39
End: 2024-11-26
Payer: COMMERCIAL

## 2024-11-29 ENCOUNTER — HOSPITAL ENCOUNTER (OUTPATIENT)
Dept: PHYSICAL THERAPY | Age: 39
Setting detail: THERAPIES SERIES
Discharge: HOME | End: 2024-11-29
Payer: COMMERCIAL

## 2024-11-29 PROCEDURE — 97110 THERAPEUTIC EXERCISES: CPT | Performed by: PHYSICAL THERAPIST

## 2024-11-29 PROCEDURE — 97140 MANUAL THERAPY 1/> REGIONS: CPT | Performed by: PHYSICAL THERAPIST

## 2024-11-29 PROCEDURE — 97530 THERAPEUTIC ACTIVITIES: CPT | Performed by: PHYSICAL THERAPIST

## 2024-11-29 NOTE — FLOWSHEET NOTE
[facility]- Outpatient Rehabilitation and Therapy [address], [place] [address] office: [phone] fax: [phone]         Physical Therapy: TREATMENT/PROGRESS NOTE   Patient: Megha Villeda (39 y.o. female)   Examination Date: 2024   :  1985 MRN: [id number]   Visit #: 8   Insurance Allowable Auth Needed   20 []Yes    []No    Insurance: Payor: [employer] / Plan: [employer] - CHOICE PLUS / Product Type: *No Product type* /   Insurance ID: [id number] - (Commercial)  Secondary Insurance (if applicable):    Treatment Diagnosis: neck pain. Weak core,    Medical Diagnosis:  Neck pain without injury [M54.2]   Referring Physician: Erin Carrizales PA  PCP: Erin Carrizales PA     Plan of care signed (Y/N): signed 10/29/24    Date of Patient follow up with Physician:      Plan of Care Report: NO  POC update due: (10 visits /OR AUTH LIMITS, whichever is less)  2024                                             Medical History:  Comorbidities:  Cancer/Tumor  COVID-19  Relevant Medical History: unremarkable                                         Precautions/ Contra-indications:           Latex allergy:  NO  Pacemaker:    NO  Contraindications for Manipulation: None  Date of Surgery: NA  Other:    Red Flags:  None    Suicide Screening:   The patient did not verbalize a primary behavioral concern, suicidal ideation, suicidal intent, or demonstrate suicidal behaviors.    Preferred Language for Healthcare:   [x] English       [] other:    SUBJECTIVE EXAMINATION     Patient stated complaint: Pt had onset of pain in neck w/ radicular symptoms on waking up on ~10/12/24. Steroid was helpful but did not relieve all the pain. Xrays were neg for abnormalities. Pt did try therapy that helped a little at [facility] w/ DN and massage. Denies n/t but has change in sensation in R UE/dominant UE. Pt has band feeling around R arm proximally.    24 Pt had PA appt on 24- per note -

## 2024-12-04 ENCOUNTER — HOSPITAL ENCOUNTER (OUTPATIENT)
Dept: PHYSICAL THERAPY | Age: 39
Setting detail: THERAPIES SERIES
Discharge: HOME | End: 2024-12-04
Payer: COMMERCIAL

## 2024-12-04 PROCEDURE — 97140 MANUAL THERAPY 1/> REGIONS: CPT

## 2024-12-04 PROCEDURE — 97110 THERAPEUTIC EXERCISES: CPT

## 2024-12-04 PROCEDURE — 97112 NEUROMUSCULAR REEDUCATION: CPT

## 2024-12-04 NOTE — FLOWSHEET NOTE
Patient history, allergies, meds reviewed. Medical chart reviewed. See intake form.     OBJECTIVE EXAMINATION     11/29/24  ROM/Strength: (Blank cells denote NT)    Mvmt (norm)  AROM R Notes PROM L PROM R Notes     CERVICAL Flex (60) 100% Inc NW       Ext (70) 75% Inc W       SB(45) 75% 75%        Rotation (80) 50% 50%           SHOULDER Flexion (180)          Abduction (180)          ER -0          ER -90 (90)          IR -0          IR -90 (70)           ELBOW Flex/biceps (140)          Ext/triceps (0)          Pronation (80)          Supination (80)             WRIST Flexion (60)          Extension (60)          RD (20)          UD (20)                         MMT L MMT R Notes     CERVICAL Cerv flexion       Cerv extension       Cerv SB       Cerv rotation          SHOULDER Flexion 4+ 4+     Abduction 4+ 4+     ER -0       ER -90       IR -0       IR -90        ELBOW Flex/biceps 4+ 4+     Ext/triceps 4+ 4/4+     Pronation       supination          WRIST Flexion 4+ 4+     Extension 4+ 4+     RD       UD               Palpation:   Patient reported tenderness with palpation  Location:pt very tender and tight in neck muscles R>L; s/cs tender point noted at PC7 and PC5 on R that responded well to manual tech.     Posture:   WNL    Bandages/Dressings/Incisions:  Not Applicable    Dermatomes: Abnormal findings listed below  None, all WNL    Myotomes: Abnormal findings listed below  None, all WNL    Reflexes: Abnormal findings listed below  C5-6 Biceps: 2+ Bilateral  C7-8 Triceps: 2+ Bilateral  Cantrell's normal     Specific Joint Mobility Testing/Accessory Motions:      Cervical: hypomobility of R side      Gait:    Pattern: antalgic pattern and decreased trunk rotation; amb w/ guarded position  Assistive Device Used: no AD    Special Tests:  [] None Assessed   [] Following tests noted:    Distraction: relief noted    Balance:  [x] WNL      [] NT       [] Dysfunction noted  Comment:     Falls Risk Assessment (30

## 2024-12-11 ENCOUNTER — HOSPITAL ENCOUNTER (OUTPATIENT)
Dept: PHYSICAL THERAPY | Age: 39
Setting detail: THERAPIES SERIES
End: 2024-12-11
Payer: COMMERCIAL

## 2025-01-28 DIAGNOSIS — R87.618 OTHER ABNORMAL CYTOLOGICAL FINDING OF SPECIMEN FROM CERVIX: Primary | ICD-10-CM

## 2025-01-29 ENCOUNTER — OFFICE VISIT (OUTPATIENT)
Dept: GYNECOLOGY | Age: 40
End: 2025-01-29
Payer: COMMERCIAL

## 2025-01-29 VITALS
DIASTOLIC BLOOD PRESSURE: 62 MMHG | WEIGHT: 132 LBS | BODY MASS INDEX: 21.21 KG/M2 | HEIGHT: 66 IN | SYSTOLIC BLOOD PRESSURE: 108 MMHG

## 2025-01-29 DIAGNOSIS — R87.820 PAPANICOLAOU SMEAR OF CERVIX WITH LOW RISK HUMAN PAPILLOMAVIRUS (HPV) DNA TEST POSITIVE: Primary | ICD-10-CM

## 2025-01-29 PROCEDURE — 99213 OFFICE O/P EST LOW 20 MIN: CPT | Performed by: OBSTETRICS & GYNECOLOGY

## 2025-01-29 PROCEDURE — 1036F TOBACCO NON-USER: CPT | Performed by: OBSTETRICS & GYNECOLOGY

## 2025-01-29 PROCEDURE — G8427 DOCREV CUR MEDS BY ELIG CLIN: HCPCS | Performed by: OBSTETRICS & GYNECOLOGY

## 2025-01-29 PROCEDURE — G8420 CALC BMI NORM PARAMETERS: HCPCS | Performed by: OBSTETRICS & GYNECOLOGY

## 2025-01-29 NOTE — PROGRESS NOTES
Subjective:      Patient ID: Megha Villeda is a 39 y.o. female.    HPI  pts here for repeat pap.  She denies complaints.    Review of Systems Pertinent review of systems items discussed above.  All others systems items not discussed above were negative.      Objective:   Physical Exam  Af, vss  Ext- no lesions  Meatus- no lesions  Bladder- good support  Vag- no d/c  Cx- no lesions, IUD strings seen  pap  Assessment:   Normal gyn exam, h/o HPV     Plan:   She will check to see if she is due for another IUD.  Call with results.  F/u pap in four months.       Mike Cruz MD

## 2025-01-30 ENCOUNTER — OFFICE VISIT (OUTPATIENT)
Dept: ENT CLINIC | Age: 40
End: 2025-01-30

## 2025-01-30 VITALS
DIASTOLIC BLOOD PRESSURE: 80 MMHG | HEART RATE: 87 BPM | HEIGHT: 66 IN | SYSTOLIC BLOOD PRESSURE: 126 MMHG | OXYGEN SATURATION: 98 % | WEIGHT: 132 LBS | BODY MASS INDEX: 21.21 KG/M2

## 2025-01-30 DIAGNOSIS — L98.9 FACIAL LESION: ICD-10-CM

## 2025-01-30 DIAGNOSIS — J34.2 DEVIATED NASAL SEPTUM: ICD-10-CM

## 2025-01-30 DIAGNOSIS — J34.3 HYPERTROPHY OF BOTH INFERIOR NASAL TURBINATES: ICD-10-CM

## 2025-01-30 DIAGNOSIS — M86.8X8 POTT'S PUFFY TUMOR (FRONTAL BONE OSTEOMYELITIS WITH SUBPERIOSTEAL ABSCESS): ICD-10-CM

## 2025-01-30 DIAGNOSIS — J31.0 CHRONIC RHINITIS: ICD-10-CM

## 2025-01-30 DIAGNOSIS — J32.1 CHRONIC FRONTAL SINUSITIS: Primary | ICD-10-CM

## 2025-01-30 LAB
HPV HR 12 DNA SPEC QL NAA+PROBE: NOT DETECTED
HPV16 DNA SPEC QL NAA+PROBE: NOT DETECTED
HPV16+18+H RISK 12 DNA SPEC-IMP: NORMAL
HPV18 DNA SPEC QL NAA+PROBE: NOT DETECTED

## 2025-01-30 NOTE — PROGRESS NOTES
[facility]  DIVISION OF OTOLARYNGOLOGY- HEAD & NECK SURGERY  CONSULT      Megha Villeda (:  1985) is a 39 y.o. female, here for evaluation of the following chief complaint(s):  Mass (Above left eye right jaw line )      ASSESSMENT/PLAN:  1. Chronic frontal sinusitis  -     CT SINUS FOR IMAGE GUIDANCE; Future  2. Facial lesion  3. Deviated nasal septum  4. 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 female here today for evaluation of the the above-noted complaints.      -On exam, the patient's facial lesion appears to be a lymph node in the subcutaneous tissue.  Reassurance was provided  -The lesion the patient is describing overlying the frontal bone may be related to something such as chronic frontal sinusitis and pots puffy tumor.  She is set to see dermatology in the near future.  If they do not feel is related to cystic acne, I would like her to get the CT scan to rule out underlying chronic sinusitis.  If that were the case, we discussed treatment options for it.  I would have asked her to obtain photodocumentation.  -Discussed trial of intranasal corticosteroid spray for deviated septum and turbinate hypertrophy in the setting of nasal obstruction    Medical Decision Making:  The following items were considered in medical decision making:  Independent review of images  Review / order clinical lab tests  Review / order radiology tests  Decision to obtain old records  [facility]    SUBJECTIVE/OBJECTIVE:  HPI    Megha Villeda is here today for evaluation of facial swelling located over the left forehead as well as a cystic lesion of the right mid face    Duration of symptoms: 1 year  Prior treatments tried: Antibiotics  Nasal obstruction: Intermittent  Facial pain and pressure: Located in the left forehead  Anosmia/hyposmia:

## 2025-07-08 ENCOUNTER — OFFICE VISIT (OUTPATIENT)
Dept: GYNECOLOGY | Age: 40
End: 2025-07-08
Payer: COMMERCIAL

## 2025-07-08 VITALS — BODY MASS INDEX: 22.6 KG/M2 | SYSTOLIC BLOOD PRESSURE: 110 MMHG | WEIGHT: 140 LBS | DIASTOLIC BLOOD PRESSURE: 78 MMHG

## 2025-07-08 DIAGNOSIS — R87.820 PAPANICOLAOU SMEAR OF CERVIX WITH LOW RISK HUMAN PAPILLOMAVIRUS (HPV) DNA TEST POSITIVE: Primary | ICD-10-CM

## 2025-07-08 PROCEDURE — 99213 OFFICE O/P EST LOW 20 MIN: CPT | Performed by: OBSTETRICS & GYNECOLOGY

## 2025-07-08 PROCEDURE — 1036F TOBACCO NON-USER: CPT | Performed by: OBSTETRICS & GYNECOLOGY

## 2025-07-08 PROCEDURE — G8427 DOCREV CUR MEDS BY ELIG CLIN: HCPCS | Performed by: OBSTETRICS & GYNECOLOGY

## 2025-07-08 PROCEDURE — G8420 CALC BMI NORM PARAMETERS: HCPCS | Performed by: OBSTETRICS & GYNECOLOGY

## 2025-07-08 ASSESSMENT — PATIENT HEALTH QUESTIONNAIRE - PHQ9
SUM OF ALL RESPONSES TO PHQ QUESTIONS 1-9: 0
1. LITTLE INTEREST OR PLEASURE IN DOING THINGS: NOT AT ALL
SUM OF ALL RESPONSES TO PHQ QUESTIONS 1-9: 0
SUM OF ALL RESPONSES TO PHQ QUESTIONS 1-9: 0
2. FEELING DOWN, DEPRESSED OR HOPELESS: NOT AT ALL
SUM OF ALL RESPONSES TO PHQ QUESTIONS 1-9: 0

## 2025-07-08 NOTE — PROGRESS NOTES
Subjective:      Patient ID: Megha Villeda is a 40 y.o. female.    HPI  pts here for repeat pap.  She denies complaints.    Review of Systems Pertinent review of systems items discussed above.  All others systems items not discussed above were negative.      Objective:   Physical Exam  Af, vss  Ext- no lesions  Meatus- no lesions  Bladder- good support  Vag- no d/c  Cx- IUD strings seen, no lesions  Pap    Assessment:   H/o abnormal pap     Plan:   Call with results.  F/u pap in four months.       Mike Cruz MD

## 2025-08-29 ENCOUNTER — TELEPHONE (OUTPATIENT)
Dept: ENT CLINIC | Age: 40
End: 2025-08-29

## 2025-09-05 ENCOUNTER — HOSPITAL ENCOUNTER (OUTPATIENT)
Dept: CT IMAGING | Age: 40
Discharge: HOME | End: 2025-09-05
Attending: STUDENT IN AN ORGANIZED HEALTH CARE EDUCATION/TRAINING PROGRAM
Payer: COMMERCIAL

## 2025-09-05 DIAGNOSIS — J32.1 CHRONIC FRONTAL SINUSITIS: ICD-10-CM

## 2025-09-05 PROCEDURE — 70486 CT MAXILLOFACIAL W/O DYE: CPT

## (undated) DEVICE — TROCAR: Brand: KII FIOS FIRST ENTRY

## (undated) DEVICE — DRAPE LAP 104X35X124IN BLU CTRL + FAB REINF ABD FEN

## (undated) DEVICE — CATHETER,URETHRAL,REDRUBBER,STRL,16FR: Brand: MEDLINE

## (undated) DEVICE — APPLICATOR MEDICATED 26 CC SOLUTION HI LT ORNG CHLORAPREP

## (undated) DEVICE — 3M™ TEGADERM™ TRANSPARENT FILM DRESSING FRAME STYLE, 1624W, 2-3/8 IN X 2-3/4 IN (6 CM X 7 CM), 100/CT 4CT/CASE: Brand: 3M™ TEGADERM™

## (undated) DEVICE — MINOR LITHOTOMY: Brand: MEDLINE INDUSTRIES, INC.

## (undated) DEVICE — SOLUTION SCRB 4OZ 10% POVIDONE IOD ANTIMIC BTL

## (undated) DEVICE — SET INSUF TUBE HEAT ISO CONN DISP

## (undated) DEVICE — GAUZE,SPONGE,2"X2",8PLY,STERILE,LF,2'S: Brand: MEDLINE

## (undated) DEVICE — SOLUTION IV IRRIG POUR BRL 0.9% SODIUM CHL 2F7124

## (undated) DEVICE — GLOVE SURG SZ 8 CRM LTX FREE POLYISOPRENE POLYMER BEAD ANTI

## (undated) DEVICE — ELECTRODE PT RET AD L9FT HI MOIST COND ADH HYDRGEL CORDED

## (undated) DEVICE — KIT,ANTI FOG,W/SPONGE & FLUID,SOFT PACK: Brand: MEDLINE

## (undated) DEVICE — 3M™ STERI-STRIP™ COMPOUND BENZOIN TINCTURE 40 BAGS/CARTON 4 CARTONS/CASE C1544: Brand: 3M™ STERI-STRIP™

## (undated) DEVICE — SUTURE VCRL + SZ 4-0 L18IN ABSRB UD L19MM PS-2 3/8 CIR PRIM VCP496H

## (undated) DEVICE — SEALER ENDOSCP L37CM NANO COAT BLNT TIP LAP DIV

## (undated) DEVICE — TROCAR: Brand: KII SHIELDED BLADED ACCESS SYSTEM

## (undated) DEVICE — STRIP,CLOSURE,WOUND,MEDI-STRIP,1/2X4: Brand: MEDLINE

## (undated) DEVICE — SOLUTION PREP POVIDONE IOD FOR SKIN MUCOUS MEM PRIOR TO

## (undated) DEVICE — GOWN SIRUS NONREIN XL W/TWL: Brand: MEDLINE INDUSTRIES, INC.

## (undated) DEVICE — GLOVE SURG SZ 85 CRM LTX FREE POLYISOPRENE POLYMER BEAD ANTI

## (undated) DEVICE — CANISTER, RIGID, 1200CC: Brand: MEDLINE INDUSTRIES, INC.

## (undated) DEVICE — TISSUE RETRIEVAL SYSTEM: Brand: INZII RETRIEVAL SYSTEM